# Patient Record
Sex: FEMALE | Race: WHITE | NOT HISPANIC OR LATINO | URBAN - METROPOLITAN AREA
[De-identification: names, ages, dates, MRNs, and addresses within clinical notes are randomized per-mention and may not be internally consistent; named-entity substitution may affect disease eponyms.]

---

## 2017-03-28 ENCOUNTER — SURGERY - HEALTHEAST (OUTPATIENT)
Dept: SURGERY | Facility: HOSPITAL | Age: 72
End: 2017-03-28

## 2017-03-28 ENCOUNTER — ANESTHESIA - HEALTHEAST (OUTPATIENT)
Dept: SURGERY | Facility: HOSPITAL | Age: 72
End: 2017-03-28

## 2017-03-29 ASSESSMENT — MIFFLIN-ST. JEOR: SCORE: 1041.88

## 2017-04-20 ENCOUNTER — RECORDS - HEALTHEAST (OUTPATIENT)
Dept: ADMINISTRATIVE | Facility: OTHER | Age: 72
End: 2017-04-20

## 2019-06-11 ENCOUNTER — ANESTHESIA - HEALTHEAST (OUTPATIENT)
Dept: SURGERY | Facility: HOSPITAL | Age: 74
End: 2019-06-11

## 2019-06-11 ENCOUNTER — SURGERY - HEALTHEAST (OUTPATIENT)
Dept: SURGERY | Facility: HOSPITAL | Age: 74
End: 2019-06-11

## 2019-06-11 ASSESSMENT — MIFFLIN-ST. JEOR: SCORE: 935.42

## 2019-06-12 ASSESSMENT — MIFFLIN-ST. JEOR: SCORE: 940.41

## 2019-06-16 ASSESSMENT — MIFFLIN-ST. JEOR: SCORE: 965.81

## 2019-06-17 ASSESSMENT — MIFFLIN-ST. JEOR: SCORE: 966.71

## 2019-06-18 ENCOUNTER — RECORDS - HEALTHEAST (OUTPATIENT)
Dept: LAB | Facility: CLINIC | Age: 74
End: 2019-06-18

## 2019-06-18 ENCOUNTER — AMBULATORY - HEALTHEAST (OUTPATIENT)
Dept: GERIATRICS | Facility: CLINIC | Age: 74
End: 2019-06-18

## 2019-06-18 LAB — INR PPP: 1.45 (ref 0.9–1.1)

## 2019-06-19 ENCOUNTER — COMMUNICATION - HEALTHEAST (OUTPATIENT)
Dept: GERIATRICS | Facility: CLINIC | Age: 74
End: 2019-06-19

## 2019-06-20 ENCOUNTER — RECORDS - HEALTHEAST (OUTPATIENT)
Dept: LAB | Facility: CLINIC | Age: 74
End: 2019-06-20

## 2019-06-20 ENCOUNTER — OFFICE VISIT - HEALTHEAST (OUTPATIENT)
Dept: GERIATRICS | Facility: CLINIC | Age: 74
End: 2019-06-20

## 2019-06-20 DIAGNOSIS — D50.0 BLOOD LOSS ANEMIA: ICD-10-CM

## 2019-06-20 DIAGNOSIS — R79.0 LOW MAGNESIUM LEVEL: ICD-10-CM
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
## 2019-06-20 DIAGNOSIS — Z96.642 STATUS POST TOTAL REPLACEMENT OF LEFT HIP: ICD-10-CM

## 2019-06-20 DIAGNOSIS — S72.002A HIP FRACTURE, LEFT, CLOSED, INITIAL ENCOUNTER (H): ICD-10-CM

## 2019-06-20 DIAGNOSIS — I82.4Y2 ACUTE DEEP VEIN THROMBOSIS (DVT) OF PROXIMAL VEIN OF LEFT LOWER EXTREMITY (H): ICD-10-CM

## 2019-06-20 DIAGNOSIS — E87.5 HYPERKALEMIA: ICD-10-CM

## 2019-06-20 DIAGNOSIS — E46 PROTEIN-CALORIE MALNUTRITION, UNSPECIFIED SEVERITY (H): ICD-10-CM

## 2019-06-20 LAB — INR PPP: 1.78 (ref 0.9–1.1)

## 2019-06-20 RX ORDER — HYDROXYZINE HYDROCHLORIDE 25 MG/1
25 TABLET, FILM COATED ORAL EVERY 6 HOURS PRN
Status: SHIPPED | COMMUNITY
Start: 2019-06-20

## 2019-06-21 ENCOUNTER — RECORDS - HEALTHEAST (OUTPATIENT)
Dept: LAB | Facility: CLINIC | Age: 74
End: 2019-06-21

## 2019-06-24 ENCOUNTER — OFFICE VISIT - HEALTHEAST (OUTPATIENT)
Dept: GERIATRICS | Facility: CLINIC | Age: 74
End: 2019-06-24

## 2019-06-24 DIAGNOSIS — E87.5 HYPERKALEMIA: ICD-10-CM

## 2019-06-24 DIAGNOSIS — Z96.642 STATUS POST TOTAL REPLACEMENT OF LEFT HIP: ICD-10-CM

## 2019-06-24 DIAGNOSIS — L89.152 PRESSURE INJURY OF COCCYGEAL REGION, STAGE 2 (H): ICD-10-CM

## 2019-06-24 DIAGNOSIS — D50.0 BLOOD LOSS ANEMIA: ICD-10-CM

## 2019-06-24 DIAGNOSIS — E46 PROTEIN-CALORIE MALNUTRITION, UNSPECIFIED SEVERITY (H): ICD-10-CM

## 2019-06-24 DIAGNOSIS — I82.4Y2 ACUTE DEEP VEIN THROMBOSIS (DVT) OF PROXIMAL VEIN OF LEFT LOWER EXTREMITY (H): ICD-10-CM

## 2019-06-24 LAB
ANION GAP SERPL CALCULATED.3IONS-SCNC: 11 MMOL/L (ref 5–18)
BASOPHILS # BLD AUTO: 0.1 THOU/UL (ref 0–0.2)
BASOPHILS NFR BLD AUTO: 1 % (ref 0–2)
BUN SERPL-MCNC: 25 MG/DL (ref 8–28)
CALCIUM SERPL-MCNC: 9.4 MG/DL (ref 8.5–10.5)
CHLORIDE BLD-SCNC: 99 MMOL/L (ref 98–107)
CO2 SERPL-SCNC: 27 MMOL/L (ref 22–31)
CREAT SERPL-MCNC: 0.91 MG/DL (ref 0.6–1.1)
EOSINOPHIL # BLD AUTO: 0.3 THOU/UL (ref 0–0.4)
EOSINOPHIL NFR BLD AUTO: 3 % (ref 0–6)
ERYTHROCYTE [DISTWIDTH] IN BLOOD BY AUTOMATED COUNT: 14.6 % (ref 11–14.5)
GFR SERPL CREATININE-BSD FRML MDRD: >60 ML/MIN/1.73M2
GLUCOSE BLD-MCNC: 90 MG/DL (ref 70–125)
HCT VFR BLD AUTO: 29.4 % (ref 35–47)
HGB BLD-MCNC: 8.6 G/DL (ref 12–16)
INR PPP: 2.11 (ref 0.9–1.1)
LYMPHOCYTES # BLD AUTO: 1.3 THOU/UL (ref 0.8–4.4)
LYMPHOCYTES NFR BLD AUTO: 11 % (ref 20–40)
MAGNESIUM SERPL-MCNC: 1.9 MG/DL (ref 1.8–2.6)
MCH RBC QN AUTO: 30.4 PG (ref 27–34)
MCHC RBC AUTO-ENTMCNC: 29.3 G/DL (ref 32–36)
MCV RBC AUTO: 104 FL (ref 80–100)
MONOCYTES # BLD AUTO: 0.8 THOU/UL (ref 0–0.9)
MONOCYTES NFR BLD AUTO: 7 % (ref 2–10)
NEUTROPHILS # BLD AUTO: 9.2 THOU/UL (ref 2–7.7)
NEUTROPHILS NFR BLD AUTO: 79 % (ref 50–70)
PLATELET # BLD AUTO: 709 THOU/UL (ref 140–440)
PMV BLD AUTO: 8.5 FL (ref 8.5–12.5)
POTASSIUM BLD-SCNC: 5.1 MMOL/L (ref 3.5–5)
RBC # BLD AUTO: 2.83 MILL/UL (ref 3.8–5.4)
SODIUM SERPL-SCNC: 137 MMOL/L (ref 136–145)
TSH SERPL DL<=0.005 MIU/L-ACNC: 2.97 UIU/ML (ref 0.3–5)
WBC: 11.9 THOU/UL (ref 4–11)

## 2019-06-25 ENCOUNTER — OFFICE VISIT - HEALTHEAST (OUTPATIENT)
Dept: GERIATRICS | Facility: CLINIC | Age: 74
End: 2019-06-25

## 2019-06-25 DIAGNOSIS — I82.4Y2 ACUTE DEEP VEIN THROMBOSIS (DVT) OF PROXIMAL VEIN OF LEFT LOWER EXTREMITY (H): ICD-10-CM
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
## 2019-06-25 DIAGNOSIS — R79.0 LOW MAGNESIUM LEVEL: ICD-10-CM

## 2019-06-25 DIAGNOSIS — E55.9 VITAMIN D DEFICIENCY: ICD-10-CM

## 2019-06-25 DIAGNOSIS — E87.5 HYPERKALEMIA: ICD-10-CM

## 2019-06-25 DIAGNOSIS — D50.0 BLOOD LOSS ANEMIA: ICD-10-CM

## 2019-06-25 DIAGNOSIS — Z96.642 STATUS POST TOTAL REPLACEMENT OF LEFT HIP: ICD-10-CM

## 2019-06-25 DIAGNOSIS — E46 PROTEIN-CALORIE MALNUTRITION, UNSPECIFIED SEVERITY (H): ICD-10-CM

## 2019-06-25 LAB — 25(OH)D3 SERPL-MCNC: 19.7 NG/ML (ref 30–80)

## 2019-06-26 ENCOUNTER — RECORDS - HEALTHEAST (OUTPATIENT)
Dept: LAB | Facility: CLINIC | Age: 74
End: 2019-06-26

## 2019-06-26 LAB
FERRITIN SERPL-MCNC: 258 NG/ML (ref 10–130)
HGB BLD-MCNC: 8.5 G/DL (ref 12–16)
INR PPP: 2.29 (ref 0.9–1.1)
POTASSIUM BLD-SCNC: 5.1 MMOL/L (ref 3.5–5)

## 2019-06-27 ENCOUNTER — RECORDS - HEALTHEAST (OUTPATIENT)
Dept: LAB | Facility: CLINIC | Age: 74
End: 2019-06-27

## 2019-06-27 ENCOUNTER — OFFICE VISIT - HEALTHEAST (OUTPATIENT)
Dept: GERIATRICS | Facility: CLINIC | Age: 74
End: 2019-06-27

## 2019-06-27 DIAGNOSIS — E87.5 HYPERKALEMIA: ICD-10-CM

## 2019-06-27 DIAGNOSIS — D50.0 BLOOD LOSS ANEMIA: ICD-10-CM

## 2019-06-27 DIAGNOSIS — I82.4Y2 ACUTE DEEP VEIN THROMBOSIS (DVT) OF PROXIMAL VEIN OF LEFT LOWER EXTREMITY (H): ICD-10-CM

## 2019-06-27 DIAGNOSIS — Z96.642 STATUS POST TOTAL REPLACEMENT OF LEFT HIP: ICD-10-CM

## 2019-06-27 LAB — POTASSIUM BLD-SCNC: 4.5 MMOL/L (ref 3.5–5)

## 2019-07-01 ENCOUNTER — RECORDS - HEALTHEAST (OUTPATIENT)
Dept: LAB | Facility: CLINIC | Age: 74
End: 2019-07-01

## 2019-07-01 ENCOUNTER — COMMUNICATION - HEALTHEAST (OUTPATIENT)
Dept: GERIATRICS | Facility: CLINIC | Age: 74
End: 2019-07-01

## 2019-07-01 LAB
BASOPHILS # BLD AUTO: 0.1 THOU/UL (ref 0–0.2)
BASOPHILS NFR BLD AUTO: 1 % (ref 0–2)
EOSINOPHIL # BLD AUTO: 0.3 THOU/UL (ref 0–0.4)
EOSINOPHIL NFR BLD AUTO: 4 % (ref 0–6)
ERYTHROCYTE [DISTWIDTH] IN BLOOD BY AUTOMATED COUNT: 14.6 % (ref 11–14.5)
FOLATE SERPL-MCNC: 17.2 NG/ML
HCT VFR BLD AUTO: 32.2 % (ref 35–47)
HGB BLD-MCNC: 9.1 G/DL (ref 12–16)
INR PPP: 1.91 (ref 0.9–1.1)
LYMPHOCYTES # BLD AUTO: 1.9 THOU/UL (ref 0.8–4.4)
LYMPHOCYTES NFR BLD AUTO: 29 % (ref 20–40)
MCH RBC QN AUTO: 28.7 PG (ref 27–34)
MCHC RBC AUTO-ENTMCNC: 28.3 G/DL (ref 32–36)
MCV RBC AUTO: 102 FL (ref 80–100)
MONOCYTES # BLD AUTO: 0.8 THOU/UL (ref 0–0.9)
MONOCYTES NFR BLD AUTO: 12 % (ref 2–10)
NEUTROPHILS # BLD AUTO: 3.4 THOU/UL (ref 2–7.7)
NEUTROPHILS NFR BLD AUTO: 54 % (ref 50–70)
PLATELET # BLD AUTO: 773 THOU/UL (ref 140–440)
PMV BLD AUTO: 8.5 FL (ref 8.5–12.5)
POTASSIUM BLD-SCNC: 5 MMOL/L (ref 3.5–5)
RBC # BLD AUTO: 3.17 MILL/UL (ref 3.8–5.4)
VIT B12 SERPL-MCNC: 348 PG/ML (ref 213–816)
WBC: 6.4 THOU/UL (ref 4–11)

## 2019-07-02 ENCOUNTER — OFFICE VISIT - HEALTHEAST (OUTPATIENT)
Dept: GERIATRICS | Facility: CLINIC | Age: 74
End: 2019-07-02

## 2019-07-02 DIAGNOSIS — E87.5 HYPERKALEMIA: ICD-10-CM

## 2019-07-02 DIAGNOSIS — I82.4Y2 ACUTE DEEP VEIN THROMBOSIS (DVT) OF PROXIMAL VEIN OF LEFT LOWER EXTREMITY (H): ICD-10-CM

## 2019-07-02 DIAGNOSIS — E46 PROTEIN-CALORIE MALNUTRITION, UNSPECIFIED SEVERITY (H): ICD-10-CM

## 2019-07-02 DIAGNOSIS — Z96.642 STATUS POST TOTAL REPLACEMENT OF LEFT HIP: ICD-10-CM

## 2019-07-02 DIAGNOSIS — E55.9 VITAMIN D DEFICIENCY: ICD-10-CM

## 2019-07-02 DIAGNOSIS — D50.0 BLOOD LOSS ANEMIA: ICD-10-CM

## 2019-07-02 RX ORDER — FERROUS SULFATE 325(65) MG
1 TABLET ORAL
Status: SHIPPED | COMMUNITY
Start: 2019-07-02

## 2019-07-03 ENCOUNTER — AMBULATORY - HEALTHEAST (OUTPATIENT)
Dept: GERIATRICS | Facility: CLINIC | Age: 74
End: 2019-07-03

## 2021-05-27 ENCOUNTER — RECORDS - HEALTHEAST (OUTPATIENT)
Dept: ADMINISTRATIVE | Facility: CLINIC | Age: 76
End: 2021-05-27

## 2021-05-29 NOTE — ANESTHESIA POSTPROCEDURE EVALUATION
Patient: Vanita Pete  ARTHROPLASTY, LEFT HIP, TOTAL  Anesthesia type: spinal    Patient location: PACU  Last vitals:   Vitals Value Taken Time   /53 6/11/2019  9:44 PM   Temp 36.3  C (97.4  F) 6/11/2019  9:44 PM   Pulse 98 6/11/2019  9:44 PM   Resp 10 6/11/2019  9:44 PM   SpO2 99 % 6/11/2019  9:44 PM     Post vital signs: stable  Level of consciousness: awake and responds to simple questions  Post-anesthesia pain: pain controlled  Post-anesthesia nausea and vomiting: no  Pulmonary: unassisted, return to baseline  Cardiovascular: stable and blood pressure at baseline  Hydration: adequate  Anesthetic events: no    QCDR Measures:  ASA# 11 - Naina-op Cardiac Arrest: ASA11B - Patient did NOT experience unanticipated cardiac arrest  ASA# 12 - Naina-op Mortality Rate: ASA12B - Patient did NOT die  ASA# 13 - PACU Re-Intubation Rate: NA - No ETT / LMA used for case  ASA# 10 - Composite Anes Safety: ASA10A - No serious adverse event    Additional Notes:

## 2021-05-29 NOTE — PROGRESS NOTES
[facility]      Facility:    [facility] [[id number]]  Code Status: FULL CODE      Chief Complaint/Reason for Visit:  Chief Complaint   Patient presents with     Follow Up       HPI:   Vanita is a 74 y.o. female who was seen for an initial TCU visit. She has a past medical history of anemia, distant hx of alcohol abuse, and malnutrition.  She was hospitalized at [facility] 6/11/2019 to 6/17/2019 multiple falls at home.  She had reported falling 3 weeks ago with left pain but thought it would improve so she was not evaluated.  Subsequently she had more falls and pain became unbearable so she went to the ER.  She was to have a la left hip fracture and underwent a left RONNIE. Prior to surgery she was found to have a Left LE acute DVT and an IVC filter was placed. She was placed on coumadin postoperatively.  Post operatively her issues were pain management and electrolyte imbalance including hyperkalemia and hypomagnesemia.  These both resolved with replacement and she was discharged without oral supplements. 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  Neurosurgery recommended a repeat CT in 2 months.     Today, she states her pain improves with pain medication however she is frustrated that she has to wait longer than 4 hours and she states that the 10mg isn't enough.  I did explain that the order was written for oxycodone every 3 hours as needed and for the next couple of days she may just need that to improve until the swelling improves.  She has swelling of her lower leg and knee but minimal swelling of left hip.  She has good mobility and CMS to both limbs.  She has no s/s of DVT of right leg.  She reports she has never had a DEXA scan and she has no intention to follow up on that.  She reports she visits the doctor minimally.  She is to follow up with Orthopedics for incision assessment in 2 weeks.  Incisional dressing is to remain intact  until that visit. She also has a stage 2 pressure ulcer on her coccyx that is treated with a mepilex every 5 days.  She reports good bowel movements and denies any urinary issues.     Past Medical History:  Past Medical History:   Diagnosis Date     DVT (deep vein thrombosis) in pregnancy (H)      Fractured hip, left, closed, initial encounter (H)            Surgical History:  Past Surgical History:   Procedure Laterality Date     IR IVC FILTER PLACEMENT  6/11/2019     NO PAST SURGERIES       ND TOTAL HIP ARTHROPLASTY Left 6/11/2019    Procedure: ARTHROPLASTY, LEFT HIP, TOTAL;  Surgeon: Andre Espinal MD;  Location: [facility];  Service: Orthopedics       Family History:   No family history on file.    Social History:    Social History     Socioeconomic History     Marital status:      Spouse name: Not on file     Number of children: Not on file     Years of education: Not on file     Highest education level: Not on file   Occupational History     Not on file   Social Needs     Financial resource strain: Not on file     Food insecurity:     Worry: Not on file     Inability: Not on file     Transportation needs:     Medical: Not on file     Non-medical: Not on file   Tobacco Use     Smoking status: Current Every Day Smoker     Smokeless tobacco: Current User     Tobacco comment: smoke E-cigarettes   Substance and Sexual Activity     Alcohol use: No     Comment: former alcoholic; sober x 14 years     Drug use: No     Sexual activity: Not on file   Lifestyle     Physical activity:     Days per week: Not on file     Minutes per session: Not on file     Stress: Not on file   Relationships     Social connections:     Talks on phone: Not on file     Gets together: Not on file     Attends Restorationist service: Not on file     Active member of club or organization: Not on file     Attends meetings of clubs or organizations: Not on file     Relationship status: Not on file     Intimate partner violence:     Fear  of current or ex partner: Not on file     Emotionally abused: Not on file     Physically abused: Not on file     Forced sexual activity: Not on file   Other Topics Concern     Not on file   Social History Narrative    , daughter   Has 7 children        Review of Systems   Patient denies fever, chills, headache, lightheadedness, dizziness, rhinorrhea, cough, congestion, shortness of breath, chest pain, palpitations, abdominal pain, n/v, diarrhea, constipation, change in appetite, dysuria, frequency, burning or pain with urination.  Other than stated in HPI all other review of systems is negative.             Physical Exam     Vital signs: 113/60, HR 97, resp 24, tep 98.2, 102.9lbs.   GENERAL APPEARANCE: Thin elderly female in no acute distress.  HEENT: normocephalic, atraumatic  PERRL, sclerae anicteric, conjunctivae clear and moist, EOM intact  NECK: Supple and symmetric. Trachea is midline, no thyromegaly, no adenopathy, and no tenderness  LUNGS: Lung sounds CTA, no adventitious sounds, respiratory effort normal.  CARD: RRR, S1, S2, without murmurs, gallops, rubs, no JVD,   ABD: Soft and nontender with normal bowel sounds.   MSK: Muscle strength and tone were normal.  EXTREMITIES: edema and significant bruising to her left knee, swelling of left lower extremity.  Good CMS bilaterally LE  NEURO: Alert and oriented x 3. Face is symmetric.  SKIN: Left hip incision is dressed with occlusive dressing.  Coccyx ulcer dressed with occlusive dressing.   PSYCH: euthymic          Medication List:  Current Outpatient Medications   Medication Sig     hydrOXYzine HCl (ATARAX) 25 MG tablet Take 25 mg by mouth every 6 (six) hours as needed for itching.     acetaminophen (TYLENOL) 500 MG tablet Take 2 tablets (1,000 mg total) by mouth 3 (three) times a day.     oxyCODONE (ROXICODONE) 5 MG immediate release tablet Take 1-2 tablets (5-10 mg total) by mouth every 3 (three) hours as needed.     senna-docusate (PERICOLACE) 8.6-50  mg tablet Take 1 tablet by mouth 2 (two) times a day.     warfarin (COUMADIN/JANTOVEN) 4 MG tablet Take 1 tablet (4 mg total) by mouth daily. Take 1 tablet (4 mg) by mouth daily. Adjust dose based on INR results as directed. (Patient taking differently: Take 4 mg by mouth daily. 6/18/19 INR 1.45 4mg daily. NExt INR 6/20.  6/17/19 INR 1.63 4mg  6/16/19 INR 1.35 had 4mg  6/15/19 INR 1.13 had 4mg.  Also had 4mg 6/13 & 14th.  Take 1 tablet (4 mg) by mouth daily. Adjust dose based on INR results as directed.      )       Labs:  Recent Results (from the past 240 hour(s))   HM2(CBC w/o Differential)   Result Value Ref Range    WBC 7.4 4.0 - 11.0 thou/uL    RBC 3.40 (L) 3.80 - 5.40 mill/uL    Hemoglobin 10.3 (L) 12.0 - 16.0 g/dL    Hematocrit 34.4 (L) 35.0 - 47.0 %     (H) 80 - 100 fL    MCH 30.3 27.0 - 34.0 pg    MCHC 29.9 (L) 32.0 - 36.0 g/dL    RDW 13.9 11.0 - 14.5 %    Platelets 393 140 - 440 thou/uL    MPV 8.3 (L) 8.5 - 12.5 fL   Basic Metabolic Panel   Result Value Ref Range    Sodium 138 136 - 145 mmol/L    Potassium 4.9 3.5 - 5.0 mmol/L    Chloride 107 98 - 107 mmol/L    CO2 23 22 - 31 mmol/L    Anion Gap, Calculation 8 5 - 18 mmol/L    Glucose 88 70 - 125 mg/dL    Calcium 9.8 8.5 - 10.5 mg/dL    BUN 32 (H) 8 - 28 mg/dL    Creatinine 0.89 0.60 - 1.10 mg/dL    GFR MDRD Af Amer >60 >60 mL/min/1.73m2    GFR MDRD Non Af Amer >60 >60 mL/min/1.73m2   ECG 12 lead nursing unit performed   Result Value Ref Range    SYSTOLIC BLOOD PRESSURE  mmHg    DIASTOLIC BLOOD PRESSURE  mmHg    VENTRICULAR RATE 91 BPM    ATRIAL RATE 91 BPM    P-R INTERVAL 122 ms    QRS DURATION 74 ms    Q-T INTERVAL 346 ms    QTC CALCULATION (BEZET) 425 ms    P Axis 86 degrees    R AXIS 74 degrees    T AXIS 72 degrees    MUSE DIAGNOSIS       Sinus rhythm with Premature atrial complexes  Minimal voltage criteria for LVH, may be normal variant  ST abnormality, possible digitalis effect  Abnormal ECG    Confirmed by JAMESON FUCHS, LESLIE LOC: ([id number]) on  6/11/2019 3:28:33 PM     Echo Complete   Result Value Ref Range    LV volume diastolic 49.8 46 - 106 cm3    LV volume systolic 12.4 (!) 14 - 42 cm3    HR 77 bpm    IVSd 0.916 (!) 0.6 - 0.9 cm    LVIDd 3.72 (!) 3.8 - 5.2 cm    LVIDs 2.37 2.2 - 3.5 cm    LVOT diam 2.1 cm    LVOT mean gradient 3 mmHg    LVOT peak VTI 21.7 cm    LVOT mean neil 76.5 cm/s    LVOT peak neil 112 cm/s    LVOT peak gradient 5 mmHg    LV PWd 0.959 (!) 0.6 - 0.9 cm    MV E' lat neil 8.51 cm/s    MV E' med neil 9.86 cm/s    AV cusp sep 1.9 cm    AV cusp sep 1.9 cm    AV mean neil 95.3 cm/s    AV mean gradient 4 mmHg    AV VTI 26.4 cm    AV peak neil 124 cm/s    AO root 3.1 cm    LA size 2.5 cm    LA length 3.6 cm    MV decel slope 6,110 mm/s2    MV decel time 208 ms    MV P 1/2 time 54 ms    MV peak A neil 73.7 cm/s    MV peak E neil 76.2 cm/s    MV mean neil 71.8 cm/s    MV mean gradient 2 mmHg    MV VTI 19.8 cm    MV peak velocityoctiy 105 cm/s    TR peak neil 286 cm/s    LA area 2 10.3 cm2    LA area 1 10.7 cm2    BSA 1.42 m2    Hieght 65 in    Weight 1,550.4 lbs    /72 mmHg    IVS/PW ratio 1.0     TR peak gradent 32.7 mmHg    LV FS 36.3 28 - 44 %    Echo LVEF calculated 75 55 - 75 %    LA volume 26.0 mL    LV mass 103.5 g    AV area 2.8 cm2    AV DIM IND neil 0.9     MV area p 1/2 time 4.1 cm2    MV area cont eq 3.8 cm2    MV E/A Ratio 1.0     LVOT area 3.46 cm2    LVOT SV 75.1 cm3    AV peak gradient 6.2 mmHg    MV peak gradient 4.4 mmHg    LV systolic volume index 8.7 8 - 24 cm3/m2    LV diastolic volume index 35.1 29 - 61 cm3/m2    LA volume index 18.3 mL/m2    LV mass index 72.9 g/m2    LV SVi 52.9 ml/m2    TAPSE 2.0 cm    MV med E/e' ratio 7.7     MV lat E/e' ratio 9.0     LV CO 5.8 l/min    LV Ci 4.1 l/min/m2    Height 65.0 in    Weight 97 lbs    MV Avg E/e' Ratio 8.3 cm/s    AV DIM IND VTI 0.8     MVA VTI 3.79 cm2    Echo LVEF Estimated 65 %   Magnesium   Result Value Ref Range    Magnesium 1.7 (L) 1.8 - 2.6 mg/dL   Protime-INR   Result  Value Ref Range    INR 0.91 0.90 - 1.10   Hemoglobin   Result Value Ref Range    Hemoglobin 8.9 (L) 12.0 - 16.0 g/dL   Basic metabolic panel   Result Value Ref Range    Sodium 136 136 - 145 mmol/L    Potassium 6.6 (HH) 3.5 - 5.0 mmol/L    Chloride 108 (H) 98 - 107 mmol/L    CO2 19 (L) 22 - 31 mmol/L    Anion Gap, Calculation 9 5 - 18 mmol/L    Glucose 94 70 - 125 mg/dL    Calcium 8.5 8.5 - 10.5 mg/dL    BUN 20 8 - 28 mg/dL    Creatinine 0.84 0.60 - 1.10 mg/dL    GFR MDRD Af Amer >60 >60 mL/min/1.73m2    GFR MDRD Non Af Amer >60 >60 mL/min/1.73m2   Magnesium   Result Value Ref Range    Magnesium 2.7 (H) 1.8 - 2.6 mg/dL   INR   Result Value Ref Range    INR 1.00 0.90 - 1.10   HM1 (CBC with Diff)   Result Value Ref Range    WBC 7.3 4.0 - 11.0 thou/uL    RBC 2.39 (L) 3.80 - 5.40 mill/uL    Hemoglobin 7.3 (L) 12.0 - 16.0 g/dL    Hematocrit 24.6 (L) 35.0 - 47.0 %     (H) 80 - 100 fL    MCH 30.5 27.0 - 34.0 pg    MCHC 29.7 (L) 32.0 - 36.0 g/dL    RDW 13.7 11.0 - 14.5 %    Platelets 259 140 - 440 thou/uL    MPV 8.2 (L) 8.5 - 12.5 fL    Neutrophils % 81 (H) 50 - 70 %    Lymphocytes % 10 (L) 20 - 40 %    Monocytes % 9 2 - 10 %    Eosinophils % 0 0 - 6 %    Basophils % 0 0 - 2 %    Neutrophils Absolute 5.9 2.0 - 7.7 thou/uL    Lymphocytes Absolute 0.7 (L) 0.8 - 4.4 thou/uL    Monocytes Absolute 0.6 0.0 - 0.9 thou/uL    Eosinophils Absolute 0.0 0.0 - 0.4 thou/uL    Basophils Absolute 0.0 0.0 - 0.2 thou/uL   ECG 12 lead with MUSE, verify if completed in ER   Result Value Ref Range    SYSTOLIC BLOOD PRESSURE  mmHg    DIASTOLIC BLOOD PRESSURE  mmHg    VENTRICULAR RATE 86 BPM    ATRIAL RATE 86 BPM    P-R INTERVAL 122 ms    QRS DURATION 78 ms    Q-T INTERVAL 362 ms    QTC CALCULATION (BEZET) 433 ms    P Axis 79 degrees    R AXIS 59 degrees    T AXIS 60 degrees    MUSE DIAGNOSIS       Normal sinus rhythm  Normal ECG  When compared with ECG of 11-JUN-2019 12:23,  Premature atrial complexes are no longer Present  Confirmed by  EPIFANIO GRIJALVA MD LOC:[facility] ([id number]) on 6/12/2019 4:32:23 PM     Potassium   Result Value Ref Range    Potassium 4.8 3.5 - 5.0 mmol/L   Albumin   Result Value Ref Range    Albumin 2.7 (L) 3.5 - 5.0 g/dL   POCT Glucose   Result Value Ref Range    Glucose 150 (H) 70 - 139 mg/dL   Basic Metabolic Panel   Result Value Ref Range    Sodium 136 136 - 145 mmol/L    Potassium 5.0 3.5 - 5.0 mmol/L    Chloride 102 98 - 107 mmol/L    CO2 28 22 - 31 mmol/L    Anion Gap, Calculation 6 5 - 18 mmol/L    Glucose 112 70 - 125 mg/dL    Calcium 8.2 (L) 8.5 - 10.5 mg/dL    BUN 23 8 - 28 mg/dL    Creatinine 1.00 0.60 - 1.10 mg/dL    GFR MDRD Af Amer >60 >60 mL/min/1.73m2    GFR MDRD Non Af Amer 54 (L) >60 mL/min/1.73m2   Hemoglobin   Result Value Ref Range    Hemoglobin 6.4 (LL) 12.0 - 16.0 g/dL   Type and Screen   Result Value Ref Range    ABORh A POS     Antibody Screen Negative Negative   Magnesium   Result Value Ref Range    Magnesium 1.6 (L) 1.8 - 2.6 mg/dL   Basic Metabolic Panel   Result Value Ref Range    Sodium 136 136 - 145 mmol/L    Potassium 4.4 3.5 - 5.0 mmol/L    Chloride 105 98 - 107 mmol/L    CO2 26 22 - 31 mmol/L    Anion Gap, Calculation 5 5 - 18 mmol/L    Glucose 93 70 - 125 mg/dL    Calcium 8.1 (L) 8.5 - 10.5 mg/dL    BUN 20 8 - 28 mg/dL    Creatinine 0.81 0.60 - 1.10 mg/dL    GFR MDRD Af Amer >60 >60 mL/min/1.73m2    GFR MDRD Non Af Amer >60 >60 mL/min/1.73m2   Hemoglobin - Daily x 2   Result Value Ref Range    Hemoglobin 8.0 (L) 12.0 - 16.0 g/dL   Crossmatch   Result Value Ref Range    Crossmatch Compatible     Blood Expiration Date [id number]     Unit Type A Pos     Unit Number [id number]     Status Transfused     Component Red Blood Cells     PRODUCT CODE H7410M61     Issue Date and Time [id number]     Blood Type 6200     CODING SYSTEM [id number]    INR   Result Value Ref Range    INR 0.94 0.90 - 1.10   Basic Metabolic Panel   Result Value Ref Range    Sodium 138 136 - 145 mmol/L    Potassium 4.2 3.5 - 5.0  mmol/L    Chloride 107 98 - 107 mmol/L    CO2 26 22 - 31 mmol/L    Anion Gap, Calculation 5 5 - 18 mmol/L    Glucose 113 70 - 125 mg/dL    Calcium 8.3 (L) 8.5 - 10.5 mg/dL    BUN 19 8 - 28 mg/dL    Creatinine 0.73 0.60 - 1.10 mg/dL    GFR MDRD Af Amer >60 >60 mL/min/1.73m2    GFR MDRD Non Af Amer >60 >60 mL/min/1.73m2   Hemoglobin   Result Value Ref Range    Hemoglobin 7.8 (L) 12.0 - 16.0 g/dL   Magnesium   Result Value Ref Range    Magnesium 1.6 (L) 1.8 - 2.6 mg/dL   INR   Result Value Ref Range    INR 1.13 (H) 0.90 - 1.10   Basic Metabolic Panel   Result Value Ref Range    Sodium 140 136 - 145 mmol/L    Potassium 4.7 3.5 - 5.0 mmol/L    Chloride 105 98 - 107 mmol/L    CO2 28 22 - 31 mmol/L    Anion Gap, Calculation 7 5 - 18 mmol/L    Glucose 97 70 - 125 mg/dL    Calcium 8.8 8.5 - 10.5 mg/dL    BUN 21 8 - 28 mg/dL    Creatinine 0.71 0.60 - 1.10 mg/dL    GFR MDRD Af Amer >60 >60 mL/min/1.73m2    GFR MDRD Non Af Amer >60 >60 mL/min/1.73m2   Hemoglobin   Result Value Ref Range    Hemoglobin 8.0 (L) 12.0 - 16.0 g/dL   Magnesium   Result Value Ref Range    Magnesium 1.8 1.8 - 2.6 mg/dL   INR   Result Value Ref Range    INR 1.35 (H) 0.90 - 1.10   Magnesium   Result Value Ref Range    Magnesium 1.6 (L) 1.8 - 2.6 mg/dL   Basic Metabolic Panel   Result Value Ref Range    Sodium 137 136 - 145 mmol/L    Potassium 4.6 3.5 - 5.0 mmol/L    Chloride 103 98 - 107 mmol/L    CO2 29 22 - 31 mmol/L    Anion Gap, Calculation 5 5 - 18 mmol/L    Glucose 100 70 - 125 mg/dL    Calcium 8.5 8.5 - 10.5 mg/dL    BUN 21 8 - 28 mg/dL    Creatinine 0.69 0.60 - 1.10 mg/dL    GFR MDRD Af Amer >60 >60 mL/min/1.73m2    GFR MDRD Non Af Amer >60 >60 mL/min/1.73m2   Hemoglobin   Result Value Ref Range    Hemoglobin 7.2 (L) 12.0 - 16.0 g/dL   INR   Result Value Ref Range    INR 1.63 (H) 0.90 - 1.10   Magnesium   Result Value Ref Range    Magnesium 1.8 1.8 - 2.6 mg/dL   Basic Metabolic Panel   Result Value Ref Range    Sodium 139 136 - 145 mmol/L     Potassium 4.9 3.5 - 5.0 mmol/L    Chloride 101 98 - 107 mmol/L    CO2 31 22 - 31 mmol/L    Anion Gap, Calculation 7 5 - 18 mmol/L    Glucose 98 70 - 125 mg/dL    Calcium 8.8 8.5 - 10.5 mg/dL    BUN 22 8 - 28 mg/dL    Creatinine 0.75 0.60 - 1.10 mg/dL    GFR MDRD Af Amer >60 >60 mL/min/1.73m2    GFR MDRD Non Af Amer >60 >60 mL/min/1.73m2   Hemoglobin   Result Value Ref Range    Hemoglobin 7.5 (L) 12.0 - 16.0 g/dL   INR   Result Value Ref Range    INR 1.45 (H) 0.90 - 1.10   INR   Result Value Ref Range    INR 1.78 (H) 0.90 - 1.10         Assessment:    ICD-10-CM    1. Status post total replacement of left hip Z96.642    2. Fall, sequela W19.XXXS    3. Low magnesium level R79.0    4. Hyperkalemia E87.5    5. Blood loss anemia D50.0    6. Hip fracture, left, closed, initial encounter (H) S72.002A    7. Acute deep vein thrombosis (DVT) of proximal vein of left lower extremity (H) I82.4Y2    8. Protein-calorie malnutrition, unspecified severity (H) E46        Plan:  RONNIE: follow up with orthopedics in 2 weeks.  Apply ICE every hour. Continue with oxycodone 5-10mg every 3 hours.  Add hydroxyzine 25mg every 6 hours with oxycodone. Continue to wear TEDS on during the day and off at night.  Continue with Senna-S prn.     Hypomagnesemia: will check mag on Monday.     Hyperkalemia: will check potassium on Monday.     Anemia: will check CBC on Monday    Hip fracture: check TSH and vitamin D level.  It is likely patient will not agree to fosamax.  I did  on the fact that she likely needed Calcium and vitamin D for bone healing.  Will discuss again with her and hopefully she will accept.     DVT: IVC filter due to be removed in 2 weeks.  INR today was 1.78.  Will give 5mg 6/20, 6/21, 2.5mg 6/22 and 5mg 6/23 and recheck 6/24.     Malnutrition: Albumin 2.7 will add oral supplements BID    40 minutes total time spent with 20 minutes spent face to face with patient in counseling of pathology of DVT, purpose of IVC filter, and  coumadin.  Counseling of pathology of fractures and possible relation to osteoporosis and work up for that and coordination of the above plan of care.       Electronically signed by: Argenis Vera CNP

## 2021-05-29 NOTE — ANESTHESIA PREPROCEDURE EVALUATION
Anesthesia Evaluation      Patient summary reviewed   No history of anesthetic complications     Airway   Mallampati: II  Neck ROM: full   Pulmonary - normal exam    breath sounds clear to auscultation  (+) a smoker (current every day)                         Cardiovascular - negative ROS  Exercise tolerance: > or = 4 METS  (-) murmur  ECG reviewed (6/11/19: NSR w/ PACs, 91 bpm)  Rhythm: regular  Rate: normal,    no murmur   ROS comment: Echo 6/11/19:    No previous study for comparison.    Normal left ventricular size and systolic function.    Left ventricle ejection fraction is normal. The estimated left ventricular ejection fraction is 65%.    Normal right ventricular size and systolic function.    No hemodynamically significant valvular heart abnormalities.        Neuro/Psych    (+) depression, anxiety/panic attacks,     Endo/Other       Comments: Anemia    GI/Hepatic/Renal      Comments: Hx of EtOH abuse     Other findings: 75 y/o F w/ hip fx 2/2 fall at home and concurrent LLE DVT now s/p IVC filter placement by IR.    Labs 6/11/19:  WBC 7.4, Hgb 10.3, Plt 393  INR 0.91      Dental - normal exam                        Anesthesia Plan  Planned anesthetic: spinal  Avoid midazolam, anticholinergics and meperidine 2/2 age and increased risk of postop delirium.  Spinal anesthesia w/ propofol gtt.  Ketamine for positioning for spinal.  Discussed potential need to convert GA.  Discussed potential need for blood transfusion.  Decadron 4 mg and zofran for PONV ppx.  ASA 3     Anesthetic plan and risks discussed with: patient and spouse  Anesthesia plan special considerations: antiemetics,   Post-op plan: routine recovery

## 2021-05-29 NOTE — TELEPHONE ENCOUNTER
[facility] Nurse Triage Telephone Note      Provider: ADRIANA Guzman  Facility: [facility]    Facility Type: TCU    Caller: ANDREY  Call Back Number:  [phone]    Allergies: Patient has no known allergies.    Reason for call: Pt new admit 6/17. Was confusion as to who following [facility] or [facility]. Pt has [employer] ins & was at [facility] so we will follow. She had L RONNIE, L DVT & IVC filter placed.     INR 6/18/19 1.45 4mg given. What further dose & when next INR?  6/17/19 INR 1.63 had 4mg,      6/16/19 INR 1.35 had 4mg,  6/15/19 INR 1.13 had 4mg.    Verbal Order/Direction given by Provider: Give 4mg tonight, check INR in am.    Provider giving order: ADRIANA Guzman    Verbal order given to: ANDREY Wheeler RN

## 2021-05-29 NOTE — ANESTHESIA PROCEDURE NOTES
Spinal Block    Patient location during procedure: OR  Start time: 6/11/2019 6:25 PM  End time: 6/11/2019 6:28 PM  Reason for block: primary anesthetic    Staffing:  Performing  Anesthesiologist: Rosendo Houston MD    Preanesthetic Checklist  Completed: patient identified, risks, benefits, and alternatives discussed, timeout performed, consent obtained, airway assessed, oxygen available, suction available, emergency drugs available and hand hygiene performed  Spinal Block  Patient position: right lateral decubitus  Prep: ChloraPrep  Patient monitoring: heart rate, cardiac monitor, continuous pulse ox and blood pressure  Approach: midline  Location: L3-4  Injection technique: single-shot  Needle type: pencil-tip   Needle gauge: 24 G

## 2021-05-29 NOTE — ANESTHESIA CARE TRANSFER NOTE
Last vitals:   Vitals:    06/11/19 2030   BP: 115/55   Pulse: 95   Resp: 21   Temp: 37.2  C (99  F)   SpO2: 100%     Patient's level of consciousness is awake  Spontaneous respirations: yes  Maintains airway independently: yes  Dentition unchanged: yes  Oropharynx: oropharynx clear of all foreign objects    QCDR Measures:  ASA# 20 - Surgical Safety Checklist: WHO surgical safety checklist completed prior to induction    PQRS# 430 - Adult PONV Prevention: 4558F - Pt received => 2 anti-emetic agents (different classes) preop & intraop  ASA# 8 - Peds PONV Prevention: NA - Not pediatric patient, not GA or 2 or more risk factors NOT present  PQRS# 424 - Naina-op Temp Management: 4559F - At least one body temp DOCUMENTED => 35.5C or 95.9F within required timeframe  PQRS# 426 - PACU Transfer Protocol: - Transfer of care checklist used  ASA# 14 - Acute Post-op Pain: ASA14B - Patient did NOT experience pain >= 7 out of 10

## 2021-05-30 VITALS — BODY MASS INDEX: 20.06 KG/M2 | HEIGHT: 65 IN | WEIGHT: 120.37 LBS

## 2021-05-30 NOTE — PROGRESS NOTES
[facility]      Facility:    [facility] [[id number]]  Code Status: FULL CODE      Chief Complaint/Reason for Visit:  Chief Complaint   Patient presents with     Follow Up       HPI:   Vanita is a 74 y.o. female who was seen for an initial TCU visit. She has a past medical history of anemia, distant hx of alcohol abuse, and malnutrition.  She was hospitalized at [place] 6/11/2019 to 6/17/2019 multiple falls at home.  She had reported falling 3 weeks ago with left pain but thought it would improve so she was not evaluated.  Subsequently she had more falls and pain became unbearable so she went to the ER.  She was to have a la left hip fracture and underwent a left RONNIE. Prior to surgery she was found to have a Left LE acute DVT and an IVC filter was placed. She was placed on coumadin postoperatively.  Post operatively her issues were pain management and electrolyte imbalance including hyperkalemia and hypomagnesemia.  These both resolved with replacement and she was discharged without oral supplements. 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  Neurosurgery recommended a repeat CT in 2 months.     Today, reports her pain is better managed with taking her oxycodone every 3-4 hours.  She is also seen in improvement in management after scheduling the hydroxyzine for 2 days last week.  She reports she did not take oxycodone at bedtime last night and she woke up in more pain this morning and so she will take that tonight.  Her incision is well approximated without any sutures or staples and show no signs and symptoms of infection I did instruct her that she did not need to have a dressing and only to put one on for comfort if she felt she needed it.  Is good CMS to her extremities however does have significant swelling of her left extremity of 2+ pitting edema.  She does have good peripheral pulses.  She is progressing therapy and ambulating  with a walker with standby assist however she feels she is not steady enough to be discharged until next week.      Her magnesium was drawn today and it was within normal limits also her BMP was drawn and also in with normal limits.  She did have a low vitamin D of 19.7 that was drawn on Monday.  Her hemoglobin was also drawn on Monday and was a little better at 8.6 from 7.5 at hospital discharge.    For her malnutrition she prefers to drink the Ensure's rather than the boost breezes and so this was discontinued.  She was started on calcium with vitamin D and iron yesterday by Dr. Yost.  Her TSH which was drawn yesterday was within normal limits of 2.97.    Past Medical History:  Past Medical History:   Diagnosis Date     DVT (deep vein thrombosis) in pregnancy (H)      Fractured hip, left, closed, initial encounter (H)            Surgical History:  Past Surgical History:   Procedure Laterality Date     IR IVC FILTER PLACEMENT  6/11/2019     NO PAST SURGERIES       FL TOTAL HIP ARTHROPLASTY Left 6/11/2019    Procedure: ARTHROPLASTY, LEFT HIP, TOTAL;  Surgeon: Andre Espinal MD;  Location: [facility];  Service: Orthopedics       Family History:   No family history on file.    Social History:    Social History     Socioeconomic History     Marital status:      Spouse name: Not on file     Number of children: Not on file     Years of education: Not on file     Highest education level: Not on file   Occupational History     Not on file   Social Needs     Financial resource strain: Not on file     Food insecurity:     Worry: Not on file     Inability: Not on file     Transportation needs:     Medical: Not on file     Non-medical: Not on file   Tobacco Use     Smoking status: Current Every Day Smoker     Smokeless tobacco: Current User     Tobacco comment: smoke E-cigarettes   Substance and Sexual Activity     Alcohol use: No     Comment: former alcoholic; sober x 14 years     Drug use: No     Sexual  activity: Not on file   Lifestyle     Physical activity:     Days per week: Not on file     Minutes per session: Not on file     Stress: Not on file   Relationships     Social connections:     Talks on phone: Not on file     Gets together: Not on file     Attends Rastafari service: Not on file     Active member of club or organization: Not on file     Attends meetings of clubs or organizations: Not on file     Relationship status: Not on file     Intimate partner violence:     Fear of current or ex partner: Not on file     Emotionally abused: Not on file     Physically abused: Not on file     Forced sexual activity: Not on file   Other Topics Concern     Not on file   Social History Narrative    , daughter   Has 7 children        Review of Systems   Patient denies fever, chills, headache, lightheadedness, dizziness, rhinorrhea, cough, congestion, shortness of breath, chest pain, palpitations, abdominal pain, n/v, diarrhea, constipation, change in appetite, dysuria, frequency, burning or pain with urination.  Other than stated in HPI all other review of systems is negative.             Physical Exam     Vital signs: /67, heart rate 108, respiratory 16, temp 97.9.   GENERAL APPEARANCE: Thin elderly female in no acute distress.  HEENT: normocephalic, atraumatic  PERRL, sclerae anicteric, conjunctivae clear and moist, EOM intact  LUNGS: Lung sounds CTA, no adventitious sounds, respiratory effort normal.  CARD: RRR, S1, S2, without murmurs, gallops, rubs, no JVD,   ABD: Soft and nontender with normal bowel sounds.   MSK: Muscle strength and tone were normal.  EXTREMITIES: 2+ pitting edema of left lower extremity, right lower extremity with soft nonpitting edema.  Good CMS bilaterally LE  NEURO: Alert and oriented x 3. Face is symmetric.  SKIN: Left hip incision is well approximated without sutures or staples.  No drainage, signs and symptoms of infection..  Coccyx ulcer dressed with occlusive dressing.    PSYCH: euthymic          Medication List:  Current Outpatient Medications   Medication Sig     acetaminophen (TYLENOL) 500 MG tablet Take 2 tablets (1,000 mg total) by mouth 3 (three) times a day.     hydrOXYzine HCl (ATARAX) 25 MG tablet Take 25 mg by mouth every 6 (six) hours as needed for itching.     oxyCODONE (ROXICODONE) 5 MG immediate release tablet Take 1-2 tablets (5-10 mg total) by mouth every 3 (three) hours as needed.     senna-docusate (PERICOLACE) 8.6-50 mg tablet Take 1 tablet by mouth 2 (two) times a day.     warfarin (COUMADIN/JANTOVEN) 4 MG tablet Take 1 tablet (4 mg total) by mouth daily. Take 1 tablet (4 mg) by mouth daily. Adjust dose based on INR results as directed. (Patient taking differently: Take 4 mg by mouth daily. 6/18/19 INR 1.45 4mg daily. NExt INR 6/20.  6/17/19 INR 1.63 4mg  6/16/19 INR 1.35 had 4mg  6/15/19 INR 1.13 had 4mg.  Also had 4mg 6/13 & 14th.  Take 1 tablet (4 mg) by mouth daily. Adjust dose based on INR results as directed.      )       Labs:  Recent Results (from the past 240 hour(s))   INR   Result Value Ref Range    INR 1.35 (H) 0.90 - 1.10   Magnesium   Result Value Ref Range    Magnesium 1.6 (L) 1.8 - 2.6 mg/dL   Basic Metabolic Panel   Result Value Ref Range    Sodium 137 136 - 145 mmol/L    Potassium 4.6 3.5 - 5.0 mmol/L    Chloride 103 98 - 107 mmol/L    CO2 29 22 - 31 mmol/L    Anion Gap, Calculation 5 5 - 18 mmol/L    Glucose 100 70 - 125 mg/dL    Calcium 8.5 8.5 - 10.5 mg/dL    BUN 21 8 - 28 mg/dL    Creatinine 0.69 0.60 - 1.10 mg/dL    GFR MDRD Af Amer >60 >60 mL/min/1.73m2    GFR MDRD Non Af Amer >60 >60 mL/min/1.73m2   Hemoglobin   Result Value Ref Range    Hemoglobin 7.2 (L) 12.0 - 16.0 g/dL   INR   Result Value Ref Range    INR 1.63 (H) 0.90 - 1.10   Magnesium   Result Value Ref Range    Magnesium 1.8 1.8 - 2.6 mg/dL   Basic Metabolic Panel   Result Value Ref Range    Sodium 139 136 - 145 mmol/L    Potassium 4.9 3.5 - 5.0 mmol/L    Chloride 101 98 - 107  mmol/L    CO2 31 22 - 31 mmol/L    Anion Gap, Calculation 7 5 - 18 mmol/L    Glucose 98 70 - 125 mg/dL    Calcium 8.8 8.5 - 10.5 mg/dL    BUN 22 8 - 28 mg/dL    Creatinine 0.75 0.60 - 1.10 mg/dL    GFR MDRD Af Amer >60 >60 mL/min/1.73m2    GFR MDRD Non Af Amer >60 >60 mL/min/1.73m2   Hemoglobin   Result Value Ref Range    Hemoglobin 7.5 (L) 12.0 - 16.0 g/dL   INR   Result Value Ref Range    INR 1.45 (H) 0.90 - 1.10   INR   Result Value Ref Range    INR 1.78 (H) 0.90 - 1.10   Magnesium   Result Value Ref Range    Magnesium 1.9 1.8 - 2.6 mg/dL   Thyroid Stimulating Hormone (TSH)   Result Value Ref Range    TSH 2.97 0.30 - 5.00 uIU/mL   Vitamin D, Total (25-Hydroxy)   Result Value Ref Range    Vitamin D, Total (25-Hydroxy) 19.7 (L) 30.0 - 80.0 ng/mL   INR   Result Value Ref Range    INR 2.11 (H) 0.90 - 1.10   HM1 (CBC with Diff)   Result Value Ref Range    WBC 11.9 (H) 4.0 - 11.0 thou/uL    RBC 2.83 (L) 3.80 - 5.40 mill/uL    Hemoglobin 8.6 (L) 12.0 - 16.0 g/dL    Hematocrit 29.4 (L) 35.0 - 47.0 %     (H) 80 - 100 fL    MCH 30.4 27.0 - 34.0 pg    MCHC 29.3 (L) 32.0 - 36.0 g/dL    RDW 14.6 (H) 11.0 - 14.5 %    Platelets 709 (H) 140 - 440 thou/uL    MPV 8.5 8.5 - 12.5 fL    Neutrophils % 79 (H) 50 - 70 %    Lymphocytes % 11 (L) 20 - 40 %    Monocytes % 7 2 - 10 %    Eosinophils % 3 0 - 6 %    Basophils % 1 0 - 2 %    Neutrophils Absolute 9.2 (H) 2.0 - 7.7 thou/uL    Lymphocytes Absolute 1.3 0.8 - 4.4 thou/uL    Monocytes Absolute 0.8 0.0 - 0.9 thou/uL    Eosinophils Absolute 0.3 0.0 - 0.4 thou/uL    Basophils Absolute 0.1 0.0 - 0.2 thou/uL   Basic Metabolic Panel   Result Value Ref Range    Sodium 137 136 - 145 mmol/L    Potassium 5.1 (H) 3.5 - 5.0 mmol/L    Chloride 99 98 - 107 mmol/L    CO2 27 22 - 31 mmol/L    Anion Gap, Calculation 11 5 - 18 mmol/L    Glucose 90 70 - 125 mg/dL    Calcium 9.4 8.5 - 10.5 mg/dL    BUN 25 8 - 28 mg/dL    Creatinine 0.91 0.60 - 1.10 mg/dL    GFR MDRD Af Amer >60 >60 mL/min/1.73m2     GFR MDRD Non Af Amer >60 >60 mL/min/1.73m2         Assessment:    ICD-10-CM    1. Status post total replacement of left hip Z96.642    2. Fall, sequela W19.XXXS    3. Low magnesium level R79.0    4. Hyperkalemia E87.5    5. Blood loss anemia D50.0    6. Acute deep vein thrombosis (DVT) of proximal vein of left lower extremity (H) I82.4Y2    7. Protein-calorie malnutrition, unspecified severity (H) E46        Plan:  RONNIE: follow up with orthopedics in 2 weeks.  Pain is improving.  Counseled patient to continue to move and using the pain medications to assist with improve mobility.  Continue with oxycodone 5 to 10 mg every 3-4 hours we discussed stretching this out within the next week.  Hydroxyzine is now PRN.     Falls: No falls at the facility.    Vitamin D deficiency: Continue with calcium plus vitamin D but will also start on vitamin D 50,000 international units weekly x4 weeks then change to 1000 international units daily.  Will recheck a vitamin D in 1 month.  Will discontinue vitamin D draw that scheduled for tomorrow.    DVT: Continue on Coumadin 4 mg daily and is going to have an INR checked tomorrow.  Will need IVC filter removed in the near future however I would like to hold off on that until she is finished with rehab.    Low magnesium: Resolved    Hyperkalemia: Resolved we will continue to monitor by rechecking BMP next week.    Anemia: Improving will have a CBC done tomorrow with a ferritin level.    Malnutrition: DC boost breeze per patient preference and start on Ensure supplement.  Dietitian following.      Electronically signed by: Argenis Vera, CNP

## 2021-05-30 NOTE — PROGRESS NOTES
Code Status:  FULL CODE  Visit Type: Discharge Summary     Facility:  [facility] [[id number]]          PCP:  Elton Potts MD  [phone]       Admission Date to our Facility: 6/17/2019 discharge Date from our Facility: 7/3/2019    Discharge Diagnosis:    1. Status post total replacement of left hip     2. Acute deep vein thrombosis (DVT) of proximal vein of left lower extremity (H)     3. Blood loss anemia     4. Hyperkalemia     5. Vitamin D deficiency     6. Protein-calorie malnutrition, unspecified severity (H)          History of Present Illness: Vnaita Pete is a 74 y.o. female who has a past medical history of anemia, distant hx of alcohol abuse, and malnutrition.  She was hospitalized at [place] 6/11/2019 to 6/17/2019 multiple falls at home.  She had reported falling 3 weeks ago with left pain but thought it would improve so she was not evaluated.  Subsequently she had more falls and pain became unbearable so she went to the ER.  She was to have a la left hip fracture and underwent a left RONNIE. Prior to surgery she was found to have a Left LE acute DVT and an IVC filter was placed. She was placed on coumadin postoperatively.  Post operatively her issues were pain management and electrolyte imbalance including hyperkalemia and hypomagnesemia.  These both resolved with replacement and she was discharged without oral supplements. 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  Neurosurgery recommended a repeat CT in 2 months.     Skilled Nursing Facility Course: While at the TCU she progressed to ambulating with a wheeled walker and standby assist.  She has been resistant to any new medications or treatments and frequently tells me she does not plan to follow-up in regards to her possible osteoporosis, frontal hyperostosis, and anemia needing a colonoscopy.  She continues to use oxycodone frequently throughout the day for her left hip pain.  I did discuss  with her weaning down on this in the next week.  Today, I will decrease her use to 5 to 10 mg every 6 hours and would recommend decreasing that to 5 mg next week.  She continues on calcium plus vitamin D and high-dose vitamin D for vitamin D deficiency and presumed osteoporosis.  She states that she does not plan to get a DEXA scan in the near future.  She also refuses to take any alendronate.    She continues to have significant amount of lower extremity edema of her left lower extremity however this has improved in the past week.  She is concerned with her upper thigh edema and I have explained to her that this is likely related to her DVT and will take some time as that improves.  She is requesting a diuretic today and so I will give her Lasix 10 mg today and tomorrow and recommend that she follow-up with her primary provider in the future.     I also strongly encouraged her to follow-up with her primary provider next week in regards to her INR and Coumadin dosing.  Currently she is stable on 4 mg of Coumadin every day, however I am concerned with ongoing use and her potential to be lost to follow-up.  I did explain that her Coumadin would likely need to continue for the next 6 months. She verbalizes that she will plan to have her INR checked in clinic next Wednesday when she sees her primary provider. She may likely be a better candidate for for Xarelto however I be concerned with her ongoing unknown cause of anemia.  She will need to also follow-up for removal of her IVC filter in the next 2 months.    While at the TCU she did have complications with mild hyper hyperkalemia in which she did receive Kayexalate last week with her potassium returning to normal.  Yesterday her potassium was 5.0 so I recommended that she have a low potassium diet.  I did discuss with her high potassium foods to avoid.    Discharge Medications:    Current Outpatient Medications   Medication Sig Dispense Refill     calcium  carbonate-vitamin D3 (OS-WILLIAM 250+ D) 250-125 mg-unit Tab per tablet Take 1 tablet by mouth 2 (two) times a day.       [START ON 7/25/2019] cholecalciferol, vitamin D3, 1,000 unit tablet Take 1,000 Units by mouth daily.       ergocalciferol (ERGOCALCIFEROL) 50,000 unit capsule Take 50,000 Units by mouth once a week.       ferrous sulfate 325 (65 FE) MG tablet Take 1 tablet by mouth daily with breakfast.       ferrous sulfate 65 mg elemental iron Take 1 tablet by mouth daily with breakfast.       acetaminophen (TYLENOL) 500 MG tablet Take 2 tablets (1,000 mg total) by mouth 3 (three) times a day.  0     hydrOXYzine HCl (ATARAX) 25 MG tablet Take 25 mg by mouth every 6 (six) hours as needed for itching.       oxyCODONE (ROXICODONE) 5 MG immediate release tablet Take 1-2 tablets (5-10 mg total) by mouth every 3 (three) hours as needed. (Patient taking differently: Take 5-10 mg by mouth every 6 (six) hours as needed.       ) 20 tablet 0     senna-docusate (PERICOLACE) 8.6-50 mg tablet Take 1 tablet by mouth 2 (two) times a day.  0     warfarin sodium (WARFARIN ORAL) Take 4 mg by mouth daily. 7/1/19 INR 1.91  Cont 4mg daily.  Next INR 7/10/19 with PMD.             No current facility-administered medications for this visit.        For most current and accurate medication list, please contact the skilled nursing facility that this patient visit took place at.      Discharge Plan: Patient is stable to discharge to home and has refused any home care services at this time stating she we will plan to follow-up with her primary provider in the next week.  She will need to follow-up with her primary provider in the next 7 days for ongoing Coumadin management, INR draw, management of potential osteopenia/osteoporosis, recommendations for her anemia including a colonoscopy, follow-up for IVC filter removal and follow-up for hyperkalemia.  She will also need to continue to follow-up with orthopedics as recommended.    Review of  Systems   Patient denies fever, chills, headache, lightheadedness, dizziness, rhinorrhea, cough, congestion, shortness of breath, chest pain, palpitations, abdominal pain, n/v, diarrhea, constipation, change in appetite, dysuria, frequency, burning or pain with urination.  Other than stated in HPI all other review of systems is negative.         Physical Exam   Vital signs: /60, heart rate 91, respiratory 18, temp 98.2.  GENERAL APPEARANCE: Thin, well nourished, in no acute distress.  HEENT: normocephalic, atraumatic  PERRL, sclerae anicteric, conjunctivae clear and moist, EOM intact  LUNGS: Lung sounds CTA, no adventitious sounds, respiratory effort normal.  CARD: RRR, S1, S2, without murmurs, gallops, rubs,   ABD: Soft and nontender with normal bowel sounds.   MSK: Muscle strength and tone were normal.  EXTREMITIES: Nonpitting soft edema of left lower extremity wearing FAREED hose  NEURO: Alert and oriented x 3.  Face is symmetric.  SKIN: Incision of left hip is well approximated with no signs of symptoms of infection or drainage.  PSYCH: euthymic          Labs:   Recent Results (from the past 240 hour(s))   Magnesium   Result Value Ref Range    Magnesium 1.9 1.8 - 2.6 mg/dL   Thyroid Stimulating Hormone (TSH)   Result Value Ref Range    TSH 2.97 0.30 - 5.00 uIU/mL   Vitamin D, Total (25-Hydroxy)   Result Value Ref Range    Vitamin D, Total (25-Hydroxy) 19.7 (L) 30.0 - 80.0 ng/mL   INR   Result Value Ref Range    INR 2.11 (H) 0.90 - 1.10   HM1 (CBC with Diff)   Result Value Ref Range    WBC 11.9 (H) 4.0 - 11.0 thou/uL    RBC 2.83 (L) 3.80 - 5.40 mill/uL    Hemoglobin 8.6 (L) 12.0 - 16.0 g/dL    Hematocrit 29.4 (L) 35.0 - 47.0 %     (H) 80 - 100 fL    MCH 30.4 27.0 - 34.0 pg    MCHC 29.3 (L) 32.0 - 36.0 g/dL    RDW 14.6 (H) 11.0 - 14.5 %    Platelets 709 (H) 140 - 440 thou/uL    MPV 8.5 8.5 - 12.5 fL    Neutrophils % 79 (H) 50 - 70 %    Lymphocytes % 11 (L) 20 - 40 %    Monocytes % 7 2 - 10 %     Eosinophils % 3 0 - 6 %    Basophils % 1 0 - 2 %    Neutrophils Absolute 9.2 (H) 2.0 - 7.7 thou/uL    Lymphocytes Absolute 1.3 0.8 - 4.4 thou/uL    Monocytes Absolute 0.8 0.0 - 0.9 thou/uL    Eosinophils Absolute 0.3 0.0 - 0.4 thou/uL    Basophils Absolute 0.1 0.0 - 0.2 thou/uL   Basic Metabolic Panel   Result Value Ref Range    Sodium 137 136 - 145 mmol/L    Potassium 5.1 (H) 3.5 - 5.0 mmol/L    Chloride 99 98 - 107 mmol/L    CO2 27 22 - 31 mmol/L    Anion Gap, Calculation 11 5 - 18 mmol/L    Glucose 90 70 - 125 mg/dL    Calcium 9.4 8.5 - 10.5 mg/dL    BUN 25 8 - 28 mg/dL    Creatinine 0.91 0.60 - 1.10 mg/dL    GFR MDRD Af Amer >60 >60 mL/min/1.73m2    GFR MDRD Non Af Amer >60 >60 mL/min/1.73m2   Hemoglobin   Result Value Ref Range    Hemoglobin 8.5 (L) 12.0 - 16.0 g/dL   Potassium   Result Value Ref Range    Potassium 5.1 (H) 3.5 - 5.0 mmol/L   Ferritin   Result Value Ref Range    Ferritin 258 (H) 10 - 130 ng/mL   INR   Result Value Ref Range    INR 2.29 (H) 0.90 - 1.10   Potassium   Result Value Ref Range    Potassium 4.5 3.5 - 5.0 mmol/L   Folate, Serum   Result Value Ref Range    Folate 17.2 >=3.5 ng/mL   Potassium   Result Value Ref Range    Potassium 5.0 3.5 - 5.0 mmol/L   Vitamin B12   Result Value Ref Range    Vitamin B-12 348 213 - 816 pg/mL   INR   Result Value Ref Range    INR 1.91 (H) 0.90 - 1.10   HM1 (CBC with Diff)   Result Value Ref Range    WBC 6.4 4.0 - 11.0 thou/uL    RBC 3.17 (L) 3.80 - 5.40 mill/uL    Hemoglobin 9.1 (L) 12.0 - 16.0 g/dL    Hematocrit 32.2 (L) 35.0 - 47.0 %     (H) 80 - 100 fL    MCH 28.7 27.0 - 34.0 pg    MCHC 28.3 (L) 32.0 - 36.0 g/dL    RDW 14.6 (H) 11.0 - 14.5 %    Platelets 773 (H) 140 - 440 thou/uL    MPV 8.5 8.5 - 12.5 fL    Neutrophils % 54 50 - 70 %    Lymphocytes % 29 20 - 40 %    Monocytes % 12 (H) 2 - 10 %    Eosinophils % 4 0 - 6 %    Basophils % 1 0 - 2 %    Neutrophils Absolute 3.4 2.0 - 7.7 thou/uL    Lymphocytes Absolute 1.9 0.8 - 4.4 thou/uL    Monocytes  Absolute 0.8 0.0 - 0.9 thou/uL    Eosinophils Absolute 0.3 0.0 - 0.4 thou/uL    Basophils Absolute 0.1 0.0 - 0.2 thou/uL         Assessment:  1. Status post total replacement of left hip     2. Acute deep vein thrombosis (DVT) of proximal vein of left lower extremity (H)     3. Blood loss anemia     4. Hyperkalemia     5. Vitamin D deficiency     6. Protein-calorie malnutrition, unspecified severity (H)         MEDICAL EQUIPMENT NEEDS:  NA          35 total minutes spent with 20 minutes spent face-to-face with patient in counseling regarding her follow-ups, recommendations for medications regarding her osteoporosis, anemia, vitamin D deficiency, DVT and anticoagulation, recommendations for colonoscopy.    Electronically signed by: Argenis Vera CNP

## 2021-05-30 NOTE — TELEPHONE ENCOUNTER
[facility] Nurse Triage Anticoagulation Note      Provider: ADRIANA Guzman  Facility: [facility]    Facility Type: TCU    Caller: Argenis  Call Back Number:  [phone]    Reason for call: INR    Today s INR: 1.91  Previous INR: 6/26 2.29(4mg daily), 6/24 2.11(4mg daily)    Diagnosis/Goal: DVT Prophylaxis; Goal 1.8-2.5  Heparin/Lovenox: No   Currently on ABX: No  Other interacting Medications: None  Missed or refused doses: No    Nurse also reporting Heme 1, B12, K+, and folate levels.      Verbal Order/Direction given by Provider: Warfarin 4mg daily.  Check INR 7/8/19 with primary MD.  Low potassium diet.      Provider giving order: ADRIANA Guzman    Verbal order given to: Argenis Becker RN

## 2021-05-30 NOTE — PROGRESS NOTES
[facility]      Facility:    [facility] [[id number]]  Code Status: FULL CODE      Chief Complaint/Reason for Visit:  Chief Complaint   Patient presents with     Follow Up       HPI:   Vanita is a 74 y.o. female who was seen for a TCU visit. She has a past medical history of anemia, distant hx of alcohol abuse, and malnutrition.  She was hospitalized at [facility] 6/11/2019 to 6/17/2019 multiple falls at home.  She had reported falling 3 weeks ago with left pain but thought it would improve so she was not evaluated.  Subsequently she had more falls and pain became unbearable so she went to the ER.  She was to have a la left hip fracture and underwent a left RONNIE. Prior to surgery she was found to have a Left LE acute DVT and an IVC filter was placed. She was placed on coumadin postoperatively.  Post operatively her issues were pain management and electrolyte imbalance including hyperkalemia and hypomagnesemia.  These both resolved with replacement and she was discharged without oral supplements. 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  Neurosurgery recommended a repeat CT in 2 months.     Today, she has lots of questions regarding the recheck on her potassium today.  Yesterday, her BMP showed a K of 5.1.  She was given Kayexalate 15gm last night and recheck of K was 4.5.  She is not on any potassium supplements.  She denies any palpitations or chest pain.  She reports her pain is stable on the oxycodone prn.  She continues to have +1 pitting edema of her left lower extremity and she is requesting a diuretic.  She has good cms to her lower extremity with strong pedal pulse.     Her hgb checked yesterday is stable at 8.5.  She had good ferritin level and is on FeSo4 supplement.  She has not had a vit B12 checked recently.     Past Medical History:  Past Medical History:   Diagnosis Date     DVT (deep vein thrombosis) in pregnancy (H)       Fractured hip, left, closed, initial encounter (H)            Surgical History:  Past Surgical History:   Procedure Laterality Date     IR IVC FILTER PLACEMENT  6/11/2019     NO PAST SURGERIES       VA TOTAL HIP ARTHROPLASTY Left 6/11/2019    Procedure: ARTHROPLASTY, LEFT HIP, TOTAL;  Surgeon: Andre Espinal MD;  Location: [facility];  Service: Orthopedics       Family History:   No family history on file.    Social History:    Social History     Socioeconomic History     Marital status:      Spouse name: Not on file     Number of children: Not on file     Years of education: Not on file     Highest education level: Not on file   Occupational History     Not on file   Social Needs     Financial resource strain: Not on file     Food insecurity:     Worry: Not on file     Inability: Not on file     Transportation needs:     Medical: Not on file     Non-medical: Not on file   Tobacco Use     Smoking status: Current Every Day Smoker     Smokeless tobacco: Current User     Tobacco comment: smoke E-cigarettes   Substance and Sexual Activity     Alcohol use: No     Comment: former alcoholic; sober x 14 years     Drug use: No     Sexual activity: Not on file   Lifestyle     Physical activity:     Days per week: Not on file     Minutes per session: Not on file     Stress: Not on file   Relationships     Social connections:     Talks on phone: Not on file     Gets together: Not on file     Attends [employer] service: Not on file     Active member of club or organization: Not on file     Attends meetings of clubs or organizations: Not on file     Relationship status: Not on file     Intimate partner violence:     Fear of current or ex partner: Not on file     Emotionally abused: Not on file     Physically abused: Not on file     Forced sexual activity: Not on file   Other Topics Concern     Not on file   Social History Narrative    , daughter   Has 7 children        Review of Systems   Patient denies  fever, chills, headache, lightheadedness, dizziness, rhinorrhea, cough, congestion, shortness of breath, chest pain, palpitations, abdominal pain, n/v, diarrhea, constipation, change in appetite, dysuria, frequency, burning or pain with urination.  Other than stated in HPI all other review of systems is negative.             Physical Exam     Vital signs:/61, HR 92, resp 24, temp 97.7  GENERAL APPEARANCE: Thin elderly female in no acute distress.  HEENT: normocephalic, atraumatic  PERRL, sclerae anicteric, conjunctivae clear and moist, EOM intact  LUNGS: Lung sounds CTA, no adventitious sounds, respiratory effort normal.  CARD: RRR, S1, S2, without murmurs, gallops, rubs, no JVD,   ABD: Soft and nontender with normal bowel sounds.   MSK: Muscle strength and tone were normal.  EXTREMITIES: 1+ pitting edema of left lower extremity, right lower extremity with soft nonpitting edema.  Good CMS bilaterally LE +pedal pulses  NEURO: Alert and oriented x 3. Face is symmetric.  SKIN: Left hip incision is well approximated without sutures or staples.  No drainage, signs and symptoms of infection  PSYCH: euthymic          Medication List:  Current Outpatient Medications   Medication Sig     acetaminophen (TYLENOL) 500 MG tablet Take 2 tablets (1,000 mg total) by mouth 3 (three) times a day.     hydrOXYzine HCl (ATARAX) 25 MG tablet Take 25 mg by mouth every 6 (six) hours as needed for itching.     oxyCODONE (ROXICODONE) 5 MG immediate release tablet Take 1-2 tablets (5-10 mg total) by mouth every 3 (three) hours as needed.     senna-docusate (PERICOLACE) 8.6-50 mg tablet Take 1 tablet by mouth 2 (two) times a day.     warfarin (COUMADIN/JANTOVEN) 4 MG tablet Take 1 tablet (4 mg total) by mouth daily. Take 1 tablet (4 mg) by mouth daily. Adjust dose based on INR results as directed. (Patient taking differently: Take 4 mg by mouth daily. 6/18/19 INR 1.45 4mg daily. NExt INR 6/20.  6/17/19 INR 1.63 4mg  6/16/19 INR 1.35 had  4mg  6/15/19 INR 1.13 had 4mg.  Also had 4mg 6/13 & 14th.  Take 1 tablet (4 mg) by mouth daily. Adjust dose based on INR results as directed.      )       Labs:  Recent Results (from the past 240 hour(s))   INR   Result Value Ref Range    INR 1.45 (H) 0.90 - 1.10   INR   Result Value Ref Range    INR 1.78 (H) 0.90 - 1.10   Magnesium   Result Value Ref Range    Magnesium 1.9 1.8 - 2.6 mg/dL   Thyroid Stimulating Hormone (TSH)   Result Value Ref Range    TSH 2.97 0.30 - 5.00 uIU/mL   Vitamin D, Total (25-Hydroxy)   Result Value Ref Range    Vitamin D, Total (25-Hydroxy) 19.7 (L) 30.0 - 80.0 ng/mL   INR   Result Value Ref Range    INR 2.11 (H) 0.90 - 1.10   HM1 (CBC with Diff)   Result Value Ref Range    WBC 11.9 (H) 4.0 - 11.0 thou/uL    RBC 2.83 (L) 3.80 - 5.40 mill/uL    Hemoglobin 8.6 (L) 12.0 - 16.0 g/dL    Hematocrit 29.4 (L) 35.0 - 47.0 %     (H) 80 - 100 fL    MCH 30.4 27.0 - 34.0 pg    MCHC 29.3 (L) 32.0 - 36.0 g/dL    RDW 14.6 (H) 11.0 - 14.5 %    Platelets 709 (H) 140 - 440 thou/uL    MPV 8.5 8.5 - 12.5 fL    Neutrophils % 79 (H) 50 - 70 %    Lymphocytes % 11 (L) 20 - 40 %    Monocytes % 7 2 - 10 %    Eosinophils % 3 0 - 6 %    Basophils % 1 0 - 2 %    Neutrophils Absolute 9.2 (H) 2.0 - 7.7 thou/uL    Lymphocytes Absolute 1.3 0.8 - 4.4 thou/uL    Monocytes Absolute 0.8 0.0 - 0.9 thou/uL    Eosinophils Absolute 0.3 0.0 - 0.4 thou/uL    Basophils Absolute 0.1 0.0 - 0.2 thou/uL   Basic Metabolic Panel   Result Value Ref Range    Sodium 137 136 - 145 mmol/L    Potassium 5.1 (H) 3.5 - 5.0 mmol/L    Chloride 99 98 - 107 mmol/L    CO2 27 22 - 31 mmol/L    Anion Gap, Calculation 11 5 - 18 mmol/L    Glucose 90 70 - 125 mg/dL    Calcium 9.4 8.5 - 10.5 mg/dL    BUN 25 8 - 28 mg/dL    Creatinine 0.91 0.60 - 1.10 mg/dL    GFR MDRD Af Amer >60 >60 mL/min/1.73m2    GFR MDRD Non Af Amer >60 >60 mL/min/1.73m2   Hemoglobin   Result Value Ref Range    Hemoglobin 8.5 (L) 12.0 - 16.0 g/dL   Potassium   Result Value Ref  Range    Potassium 5.1 (H) 3.5 - 5.0 mmol/L   Ferritin   Result Value Ref Range    Ferritin 258 (H) 10 - 130 ng/mL   INR   Result Value Ref Range    INR 2.29 (H) 0.90 - 1.10   Potassium   Result Value Ref Range    Potassium 4.5 3.5 - 5.0 mmol/L         Assessment:    ICD-10-CM    1. Hyperkalemia E87.5    2. Acute deep vein thrombosis (DVT) of proximal vein of left lower extremity (H) I82.4Y2    3. Blood loss anemia D50.0    4. Status post total replacement of left hip Z96.642        Plan:  Hyperkalemia: resolve after 1 dose of kayexalate.  Continue to monitor and check K early next week.  Counseled patient on s/s of hyperkalemia, medication uses and expected responses.     DVT: counseled patient on timing of resolution of edema likely related to her DVT.  Explained that I would like to wait to give her a diuretic as with all medications there are side effects.     Anemia: stable; recheck CBC next week.  Will check B12 today.  I counseled patient on the importance of having a colonoscopy and she states she will not have a colonoscopy.  Again, I urged her to have this done to look for any source of bleeding causing her hgb to go down.       RONNIE: continue with therapies.  Oxycodone for pain. Will discuss weaning her oxycodone next week prior to discharge.         Electronically signed by: Argenis Vera, LESVIA

## 2021-05-30 NOTE — PROGRESS NOTES
[facility]        Visit Type: H & P (Left hip fracture status post L RONNIE)    Code Status:  FULL CODE  Facility:  [facility] [[id number]]          PCP: Elton Potts MD       PHONE: [phone]     FAX:[phone]        ASSESSMENT/PLAN:  1. Status post total replacement of left hip   improving.  Continue PT/OT, pain medications including APAP, hydroxyzine as needed, oxycodone as needed.  A very long discussion was done with the patient regarding narcotic use and the need to taper narcotics in the next week or so.  A long discussion was also done regarding osteoporosis and need for bone DEXA scan as an outpatient and starting bisphosphonates.  Patient states that she will follow-up with primary MD regarding scheduling a bone DEXA scan   2. Blood loss anemia   multifactorial with current surgery exacerbating chronic anemia of unclear cause.  Hemoglobin on 4/3/2019 is at 8.3.  Will check iron studies and recheck hemoglobin on 6/27.  Consider colonoscopy as outpatient.  We will also check B12 levels   3. Acute deep vein thrombosis (DVT) of proximal vein of left lower extremity (H)   will need to follow-up with IR regarding filter with removal after 2 months.  INR at 2.11, will continue 4 mg of Coumadin with INR goal 1.8-2.5 in this patient who has anemia.  Check INR on 6/26   4. Hyperkalemia   potassium at 5.1 with previous potassium at 4.9 on 6/17.  Avoid potassium rich foods, recheck potassium level on 6/26   5. Protein-calorie malnutrition, unspecified severity (H)   dietitian to see.  Patient is not as antispastic regarding her nutrition status since she she states that her weight has been normal for her at baseline.  I did explain to her that it is important to build her nutrition and strength to prevent further deconditioning and falls.  Albumin on 4/1/2019 is at 1.7   6. Pressure injury of coccygeal region, stage 2   continue Mepilex Q 5 days   7.       Abnormal head CT with hyperostosis-repeat head CT in 2 months per neurosurgery recommendation      HISTORY OF PRESENT ILLNESS:   Vanita Pete is a 74 y.o. female with a history of CHAN now with normal renal function, anxiety disorder, eating disorder with malnutrition, distant history of EtOH abuse, anemia who was needed for a closed left hip fracture.  According to the patient, she had multiple falls at home but her first fall was approximately 4 weeks ago where she noted mild pain on her left hip.  She noted gradual worsening of her pain and subsequent falls which brought her to the ER.  She underwent left RONNIE on 6/11/2019.  Prior to her operation, the patient was also noted to have increased left leg swelling and ultrasound showed left peroneal vein DVT.  An IVC filter was placed on 6/11/2019.  Postoperatively, the patient did not have any other complications and the patient was started on Coumadin for her DVT, she was also started on PT/OT with orthopedic recommendations of WBAT on LLE.  Of note is that there was an incidental finding of an abnormal widening of the right frontal bone with hyperostosis on CT scan of the head in the ER.  Neurosurgery was consulted and recommendation is for repeat imaging in 2 months.    Currently, the patient states that her pain is better when given oxycodone every 3 hours as needed.  She notes that she continues to have left leg swelling although denies any pain in this area.  She does have anemia which has been chronic but denies any weakness or rectal bleeding.  Her appetite is fairly stable and she states that she sleeps fine.  She denies any abdominal pain, diarrhea or constipation, urinary symptoms.  She does not have any fevers or chills, chest pains or increased shortness of breath.  She does not have any palpitations.  Not have any dizziness or lightheadedness.    Other review of systems are negative.          PAST MEDICAL/SURGICAL HISTORY:  Past Medical History:  "  Diagnosis Date     DVT (deep vein thrombosis) in pregnancy (H)      Fractured hip, left, closed, initial encounter (H)      Past Surgical History:   Procedure Laterality Date     IR IVC FILTER PLACEMENT  6/11/2019     NO PAST SURGERIES       NE TOTAL HIP ARTHROPLASTY Left 6/11/2019    Procedure: ARTHROPLASTY, LEFT HIP, TOTAL;  Surgeon: Andre Espinal MD;  Location: [facility];  Service: Orthopedics       SOCIAL HISTORY: He is  and lives with her , no alcohol use since 15 years ago, smoker/\"a few cigarettes per day\"      FAMILY HISTORY:  No family history on file.    MEDICATIONS:  Current Outpatient Medications on File Prior to Visit   Medication Sig Dispense Refill     acetaminophen (TYLENOL) 500 MG tablet Take 2 tablets (1,000 mg total) by mouth 3 (three) times a day.  0     hydrOXYzine HCl (ATARAX) 25 MG tablet Take 25 mg by mouth every 6 (six) hours as needed for itching.       oxyCODONE (ROXICODONE) 5 MG immediate release tablet Take 1-2 tablets (5-10 mg total) by mouth every 3 (three) hours as needed. 20 tablet 0     senna-docusate (PERICOLACE) 8.6-50 mg tablet Take 1 tablet by mouth 2 (two) times a day.  0     warfarin (COUMADIN/JANTOVEN) 4 MG tablet Take 1 tablet (4 mg total) by mouth daily. Take 1 tablet (4 mg) by mouth daily. Adjust dose based on INR results as directed. (Patient taking differently: Take 4 mg by mouth daily. 6/18/19 INR 1.45 4mg daily. NExt INR 6/20.  6/17/19 INR 1.63 4mg  6/16/19 INR 1.35 had 4mg  6/15/19 INR 1.13 had 4mg.  Also had 4mg 6/13 & 14th.  Take 1 tablet (4 mg) by mouth daily. Adjust dose based on INR results as directed.      ) 30 tablet 0     No current facility-administered medications on file prior to visit.        ALLERGIES:  No Known Allergies      PHYSICAL EXAMINATION:  Vital signs 108/56, 97.3, 78, 103 pounds, 16, 93% room air  General: Awake, Alert, oriented x3, not in any form of acute distress, answers questions appropriately, follows simple " commands, conversant, thin  HEENT: Pink conjunctiva, anicteric sclerae, oral mucosa is moist  NECK: Supple, without any lymphadenopathy, thyromegaly or any masses  LUNG: Clear to auscultation, good chest expansion. There are no crackles, no wheezes, normal AP diameter  BACK: No kyphosis of the thoracic spine  CVS: There is good S1  S2, there are no murmurs, no heaves, rhythm is regular  ABDOMEN: Globular and soft, nontender to palpation, no organomegaly, good bowel sounds  EXTREMITIES: Good range of motion on both upper and lower extremities except on left hip where his decrease R OM and with left surgical site that is clean, without any discharge or redness but with minimal swelling, no pedal edema on the right, +1-2 pedal edema on the left, no cyanosis or clubbing, no calf tenderness  SKIN: Warm and dry, no rashes or erythema noted    LABS:  Lab Results   Component Value Date    WBC 11.9 (H) 06/24/2019    HGB 8.5 (L) 06/26/2019    HCT 29.4 (L) 06/24/2019     (H) 06/24/2019     (H) 06/24/2019     Lab Results   Component Value Date    CREATININE 0.91 06/24/2019    BUN 25 06/24/2019     06/24/2019    K 5.1 (H) 06/26/2019    CL 99 06/24/2019    CO2 27 06/24/2019           >45 minutes of total time spent, greater than 55% of the time spent in coordination of care and counseling regarding the above medical issues and plan of care long discussion regarding medications and current medical status as discussed in plans above. I have reviewed the patient's medical records, labs and medications.       Electronically signed by:Ruth Yost MD

## 2021-06-03 VITALS — HEIGHT: 65 IN | WEIGHT: 103.8 LBS | BODY MASS INDEX: 17.29 KG/M2

## 2021-06-09 NOTE — ANESTHESIA CARE TRANSFER NOTE
Last vitals:   Vitals:    03/29/17 0020   BP: 108/55   Pulse: 95   Resp: 18   Temp: 36.2  C (97.1  F)   SpO2: 99%     Patient's level of consciousness is drowsy  Spontaneous respirations: yes  Maintains airway independently: yes  Dentition unchanged: yes  Oropharynx: oropharynx clear of all foreign objects    QCDR Measures:  ASA# 20 - Surgical Safety Checklist: ASA20A - Safety Checks Done  PQRS# 430 - Adult PONV Prevention: 4558F - Pt received => 2 anti-emetic agents (different classes) preop & intraop  ASA# 8 - Peds PONV Prevention: NA - Not pediatric patient, not GA or 2 or more risk factors NOT present  PQRS# 424 - Naina-op Temp Management: 4559F - At least one body temp DOCUMENTED => 35.5C or 95.9F within required timeframe  PQRS# 426 - PACU Transfer Protocol: - Transfer of care checklist used  ASA# 14 - Acute Post-op Pain: ASA14B - Patient did NOT experience pain >= 7 out of 10    I completed my SBAR handoff to the receiving nurse per policy and procedure.

## 2021-06-09 NOTE — ANESTHESIA PREPROCEDURE EVALUATION
Anesthesia Evaluation      Patient summary reviewed     Airway   Mallampati: II  Neck ROM: full   Pulmonary      ROS comment: ABG   Acidosis,  Ph  7.13 metabolic                           Cardiovascular - negative ROS  ECG reviewed        Neuro/Psych      Comments: Alert oriented    Endo/Other       GI/Hepatic/Renal    (+)   chronic renal disease ARF, impaired hepatic function     Other findings: Sepsis   Hand cellulitis  INR  1.27  BUN  93  Creat  4.13  K+  3.4  Na+  1.27  Abn  LFT      Dental                         Anesthesia Plan  Planned anesthetic: general LMA  Consider Gen endo  May require post op vent  ICU for intensivist management  Sepsis, Hepato-Renal, metabolic management  ASA 3 - emergent   Induction: intravenous   Anesthetic plan and risks discussed with: patient    Post-op plan: extended intubation/vent support and routine recovery

## 2021-06-09 NOTE — ANESTHESIA POSTPROCEDURE EVALUATION
Patient: Vanita Pete  IRRIGATION AND DEBRIDEMENT RIGHT HAND  Anesthesia type: general    Patient location: PACU  Last vitals:   Vitals:    03/29/17 0110   BP: 95/50   Pulse: 91   Resp: 18   Temp:    SpO2: 96%     Post vital signs: stable  Level of consciousness: awake and responds to simple questions  Post-anesthesia pain: pain controlled  Post-anesthesia nausea and vomiting: no  Pulmonary: unassisted, return to baseline  Cardiovascular: stable and blood pressure at baseline  Hydration: adequate  Anesthetic events: no    QCDR Measures:  ASA# 11 - Naina-op Cardiac Arrest: ASA11B - Patient did NOT experience unanticipated cardiac arrest  ASA# 12 - Naina-op Mortality Rate: ASA12B - Patient did NOT die  ASA# 13 - PACU Re-Intubation Rate: ASA13B - Patient did NOT require a new airway mgmt  ASA# 10 - Composite Anes Safety: ASA10A - No serious adverse event  ASA# 38 - New Corneal Injury: ASA38A - No new exposure keratitis or corneal abrasion in PACU    Additional Notes: Planned ICU admission for management of sepsis, and major blood chemistry management, electrolyte, acid-base                              Renal, Hepatic.  Pt. Alert and responsive in PACU.  No respiratory impairment.

## 2021-06-15 PROBLEM — F50.9 EATING DISORDER: Status: ACTIVE | Noted: 2017-03-29
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
## 2021-06-15 PROBLEM — E87.20 METABOLIC ACIDOSIS: Status: ACTIVE | Noted: 2017-03-29

## 2021-06-15 PROBLEM — S61.259A DOG BITE OF FINGER: Status: ACTIVE | Noted: 2017-03-29

## 2021-06-15 PROBLEM — F10.11 ALCOHOL ABUSE, IN REMISSION: Status: ACTIVE | Noted: 2017-03-29

## 2021-06-15 PROBLEM — D64.9 NORMOCYTIC ANEMIA: Status: ACTIVE | Noted: 2017-03-29

## 2021-06-15 PROBLEM — E83.51 HYPOCALCEMIA: Status: ACTIVE | Noted: 2017-03-29

## 2021-06-15 PROBLEM — T78.40XA SENSITIVITY TO MEDICATION: Status: ACTIVE | Noted: 2017-04-02

## 2021-06-15 PROBLEM — L02.511: Status: ACTIVE | Noted: 2017-03-29

## 2021-06-15 PROBLEM — N17.9 AKI (ACUTE KIDNEY INJURY) (H): Status: ACTIVE | Noted: 2017-03-29

## 2021-06-15 PROBLEM — G89.18 POSTOPERATIVE PAIN: Status: ACTIVE | Noted: 2017-03-29

## 2021-06-15 PROBLEM — M79.601 RIGHT ARM PAIN: Status: ACTIVE | Noted: 2017-03-29

## 2021-06-15 PROBLEM — A41.9 SEPSIS (H): Status: ACTIVE | Noted: 2017-03-28

## 2021-06-16 PROBLEM — R79.0 LOW MAGNESIUM LEVEL: Status: ACTIVE | Noted: 2019-06-14

## 2021-06-16 PROBLEM — D62 ANEMIA DUE TO BLOOD LOSS, ACUTE: Status: ACTIVE | Noted: 2019-06-14
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
## 2021-06-16 PROBLEM — S72.002A CLOSED LEFT HIP FRACTURE (H): Status: ACTIVE | Noted: 2019-06-11

## 2021-06-16 PROBLEM — S72.002A HIP FRACTURE, LEFT, CLOSED, INITIAL ENCOUNTER (H): Status: ACTIVE | Noted: 2019-06-11

## 2021-06-19 NOTE — LETTER
Letter by Argenis Vera CNP at      Author: Argenis Vera CNP Service: -- Author Type: --    Filed:  Encounter Date: 6/25/2019 Status: (Other)         Patient: Vanita Pete   MR Number: [id number]   YOB: 1945   Date of Visit: 6/25/2019     [facility]      Facility:    [facility] [[id number]]  Code Status: FULL CODE      Chief Complaint/Reason for Visit:  Chief Complaint   Patient presents with   ? Follow Up       HPI:   Vanita is a 74 y.o. female who was seen for an initial TCU visit. She has a past medical history of anemia, distant hx of alcohol abuse, and malnutrition.  She was hospitalized at [facility] 6/11/2019 to 6/17/2019 multiple falls at home.  She had reported falling 3 weeks ago with left pain but thought it would improve so she was not evaluated.  Subsequently she had more falls and pain became unbearable so she went to the ER.  She was to have a la left hip fracture and underwent a left RONNIE. Prior to surgery she was found to have a Left LE acute DVT and an IVC filter was placed. She was placed on coumadin postoperatively.  Post operatively her issues were pain management and electrolyte imbalance including hyperkalemia and hypomagnesemia.  These both resolved with replacement and she was discharged without oral supplements. 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  Neurosurgery recommended a repeat CT in 2 months.     Today, reports her pain is better managed with taking her oxycodone every 3-4 hours.  She is also seen in improvement in management after scheduling the hydroxyzine for 2 days last week.  She reports she did not take oxycodone at bedtime last night and she woke up in more pain this morning and so she will take that tonight.  Her incision is well approximated without any sutures or staples and show no signs and symptoms of infection I did instruct her that she did not need to have a  dressing and only to put one on for comfort if she felt she needed it.  Is good CMS to her extremities however does have significant swelling of her left extremity of 2+ pitting edema.  She does have good peripheral pulses.  She is progressing therapy and ambulating with a walker with standby assist however she feels she is not steady enough to be discharged until next week.      Her magnesium was drawn today and it was within normal limits also her BMP was drawn and also in with normal limits.  She did have a low vitamin D of 19.7 that was drawn on Monday.  Her hemoglobin was also drawn on Monday and was a little better at 8.6 from 7.5 at hospital discharge.    For her malnutrition she prefers to drink the Ensure's rather than the boost breezes and so this was discontinued.  She was started on calcium with vitamin D and iron yesterday by Dr. Yost.  Her TSH which was drawn yesterday was within normal limits of 2.97.    Past Medical History:  Past Medical History:   Diagnosis Date   ? DVT (deep vein thrombosis) in pregnancy (H)    ? Fractured hip, left, closed, initial encounter (H)            Surgical History:  Past Surgical History:   Procedure Laterality Date   ? IR IVC FILTER PLACEMENT  6/11/2019   ? NO PAST SURGERIES     ? PA TOTAL HIP ARTHROPLASTY Left 6/11/2019    Procedure: ARTHROPLASTY, LEFT HIP, TOTAL;  Surgeon: Andre Espinal MD;  Location: [facility];  Service: Orthopedics       Family History:   No family history on file.    Social History:    Social History     Socioeconomic History   ? Marital status:      Spouse name: Not on file   ? Number of children: Not on file   ? Years of education: Not on file   ? Highest education level: Not on file   Occupational History   ? Not on file   Social Needs   ? Financial resource strain: Not on file   ? Food insecurity:     Worry: Not on file     Inability: Not on file   ? Transportation needs:     Medical: Not on file     Non-medical: Not on file    Tobacco Use   ? Smoking status: Current Every Day Smoker   ? Smokeless tobacco: Current User   ? Tobacco comment: smoke E-cigarettes   Substance and Sexual Activity   ? Alcohol use: No     Comment: former alcoholic; sober x 14 years   ? Drug use: No   ? Sexual activity: Not on file   Lifestyle   ? Physical activity:     Days per week: Not on file     Minutes per session: Not on file   ? Stress: Not on file   Relationships   ? Social connections:     Talks on phone: Not on file     Gets together: Not on file     Attends Congregation service: Not on file     Active member of club or organization: Not on file     Attends meetings of clubs or organizations: Not on file     Relationship status: Not on file   ? Intimate partner violence:     Fear of current or ex partner: Not on file     Emotionally abused: Not on file     Physically abused: Not on file     Forced sexual activity: Not on file   Other Topics Concern   ? Not on file   Social History Narrative    , daughter   Has 7 children        Review of Systems   Patient denies fever, chills, headache, lightheadedness, dizziness, rhinorrhea, cough, congestion, shortness of breath, chest pain, palpitations, abdominal pain, n/v, diarrhea, constipation, change in appetite, dysuria, frequency, burning or pain with urination.  Other than stated in HPI all other review of systems is negative.             Physical Exam     Vital signs: /67, heart rate 108, respiratory 16, temp 97.9.   GENERAL APPEARANCE: Thin elderly female in no acute distress.  HEENT: normocephalic, atraumatic  PERRL, sclerae anicteric, conjunctivae clear and moist, EOM intact  LUNGS: Lung sounds CTA, no adventitious sounds, respiratory effort normal.  CARD: RRR, S1, S2, without murmurs, gallops, rubs, no JVD,   ABD: Soft and nontender with normal bowel sounds.   MSK: Muscle strength and tone were normal.  EXTREMITIES: 2+ pitting edema of left lower extremity, right lower extremity with soft  nonpitting edema.  Good CMS bilaterally LE  NEURO: Alert and oriented x 3. Face is symmetric.  SKIN: Left hip incision is well approximated without sutures or staples.  No drainage, signs and symptoms of infection..  Coccyx ulcer dressed with occlusive dressing.   PSYCH: euthymic          Medication List:  Current Outpatient Medications   Medication Sig   ? acetaminophen (TYLENOL) 500 MG tablet Take 2 tablets (1,000 mg total) by mouth 3 (three) times a day.   ? hydrOXYzine HCl (ATARAX) 25 MG tablet Take 25 mg by mouth every 6 (six) hours as needed for itching.   ? oxyCODONE (ROXICODONE) 5 MG immediate release tablet Take 1-2 tablets (5-10 mg total) by mouth every 3 (three) hours as needed.   ? senna-docusate (PERICOLACE) 8.6-50 mg tablet Take 1 tablet by mouth 2 (two) times a day.   ? warfarin (COUMADIN/JANTOVEN) 4 MG tablet Take 1 tablet (4 mg total) by mouth daily. Take 1 tablet (4 mg) by mouth daily. Adjust dose based on INR results as directed. (Patient taking differently: Take 4 mg by mouth daily. 6/18/19 INR 1.45 4mg daily. NExt INR 6/20.  6/17/19 INR 1.63 4mg  6/16/19 INR 1.35 had 4mg  6/15/19 INR 1.13 had 4mg.  Also had 4mg 6/13 & 14th.  Take 1 tablet (4 mg) by mouth daily. Adjust dose based on INR results as directed.      )       Labs:  Recent Results (from the past 240 hour(s))   INR   Result Value Ref Range    INR 1.35 (H) 0.90 - 1.10   Magnesium   Result Value Ref Range    Magnesium 1.6 (L) 1.8 - 2.6 mg/dL   Basic Metabolic Panel   Result Value Ref Range    Sodium 137 136 - 145 mmol/L    Potassium 4.6 3.5 - 5.0 mmol/L    Chloride 103 98 - 107 mmol/L    CO2 29 22 - 31 mmol/L    Anion Gap, Calculation 5 5 - 18 mmol/L    Glucose 100 70 - 125 mg/dL    Calcium 8.5 8.5 - 10.5 mg/dL    BUN 21 8 - 28 mg/dL    Creatinine 0.69 0.60 - 1.10 mg/dL    GFR MDRD Af Amer >60 >60 mL/min/1.73m2    GFR MDRD Non Af Amer >60 >60 mL/min/1.73m2   Hemoglobin   Result Value Ref Range    Hemoglobin 7.2 (L) 12.0 - 16.0 g/dL   INR    Result Value Ref Range    INR 1.63 (H) 0.90 - 1.10   Magnesium   Result Value Ref Range    Magnesium 1.8 1.8 - 2.6 mg/dL   Basic Metabolic Panel   Result Value Ref Range    Sodium 139 136 - 145 mmol/L    Potassium 4.9 3.5 - 5.0 mmol/L    Chloride 101 98 - 107 mmol/L    CO2 31 22 - 31 mmol/L    Anion Gap, Calculation 7 5 - 18 mmol/L    Glucose 98 70 - 125 mg/dL    Calcium 8.8 8.5 - 10.5 mg/dL    BUN 22 8 - 28 mg/dL    Creatinine 0.75 0.60 - 1.10 mg/dL    GFR MDRD Af Amer >60 >60 mL/min/1.73m2    GFR MDRD Non Af Amer >60 >60 mL/min/1.73m2   Hemoglobin   Result Value Ref Range    Hemoglobin 7.5 (L) 12.0 - 16.0 g/dL   INR   Result Value Ref Range    INR 1.45 (H) 0.90 - 1.10   INR   Result Value Ref Range    INR 1.78 (H) 0.90 - 1.10   Magnesium   Result Value Ref Range    Magnesium 1.9 1.8 - 2.6 mg/dL   Thyroid Stimulating Hormone (TSH)   Result Value Ref Range    TSH 2.97 0.30 - 5.00 uIU/mL   Vitamin D, Total (25-Hydroxy)   Result Value Ref Range    Vitamin D, Total (25-Hydroxy) 19.7 (L) 30.0 - 80.0 ng/mL   INR   Result Value Ref Range    INR 2.11 (H) 0.90 - 1.10   HM1 (CBC with Diff)   Result Value Ref Range    WBC 11.9 (H) 4.0 - 11.0 thou/uL    RBC 2.83 (L) 3.80 - 5.40 mill/uL    Hemoglobin 8.6 (L) 12.0 - 16.0 g/dL    Hematocrit 29.4 (L) 35.0 - 47.0 %     (H) 80 - 100 fL    MCH 30.4 27.0 - 34.0 pg    MCHC 29.3 (L) 32.0 - 36.0 g/dL    RDW 14.6 (H) 11.0 - 14.5 %    Platelets 709 (H) 140 - 440 thou/uL    MPV 8.5 8.5 - 12.5 fL    Neutrophils % 79 (H) 50 - 70 %    Lymphocytes % 11 (L) 20 - 40 %    Monocytes % 7 2 - 10 %    Eosinophils % 3 0 - 6 %    Basophils % 1 0 - 2 %    Neutrophils Absolute 9.2 (H) 2.0 - 7.7 thou/uL    Lymphocytes Absolute 1.3 0.8 - 4.4 thou/uL    Monocytes Absolute 0.8 0.0 - 0.9 thou/uL    Eosinophils Absolute 0.3 0.0 - 0.4 thou/uL    Basophils Absolute 0.1 0.0 - 0.2 thou/uL   Basic Metabolic Panel   Result Value Ref Range    Sodium 137 136 - 145 mmol/L    Potassium 5.1 (H) 3.5 - 5.0 mmol/L     Chloride 99 98 - 107 mmol/L    CO2 27 22 - 31 mmol/L    Anion Gap, Calculation 11 5 - 18 mmol/L    Glucose 90 70 - 125 mg/dL    Calcium 9.4 8.5 - 10.5 mg/dL    BUN 25 8 - 28 mg/dL    Creatinine 0.91 0.60 - 1.10 mg/dL    GFR MDRD Af Amer >60 >60 mL/min/1.73m2    GFR MDRD Non Af Amer >60 >60 mL/min/1.73m2         Assessment:    ICD-10-CM    1. Status post total replacement of left hip Z96.642    2. Fall, sequela W19.XXXS    3. Low magnesium level R79.0    4. Hyperkalemia E87.5    5. Blood loss anemia D50.0    6. Acute deep vein thrombosis (DVT) of proximal vein of left lower extremity (H) I82.4Y2    7. Protein-calorie malnutrition, unspecified severity (H) E46        Plan:  RONNIE: follow up with orthopedics in 2 weeks.  Pain is improving.  Counseled patient to continue to move and using the pain medications to assist with improve mobility.  Continue with oxycodone 5 to 10 mg every 3-4 hours we discussed stretching this out within the next week.  Hydroxyzine is now PRN.     Falls: No falls at the facility.    Vitamin D deficiency: Continue with calcium plus vitamin D but will also start on vitamin D 50,000 international units weekly x4 weeks then change to 1000 international units daily.  Will recheck a vitamin D in 1 month.  Will discontinue vitamin D draw that scheduled for tomorrow.    DVT: Continue on Coumadin 4 mg daily and is going to have an INR checked tomorrow.  Will need IVC filter removed in the near future however I would like to hold off on that until she is finished with rehab.    Low magnesium: Resolved    Hyperkalemia: Resolved we will continue to monitor by rechecking BMP next week.    Anemia: Improving will have a CBC done tomorrow with a ferritin level.    Malnutrition: DC boost breeze per patient preference and start on Ensure supplement.  Dietitian following.      Electronically signed by: Argenis Vera, CNP

## 2021-06-19 NOTE — LETTER
Letter by Argenis Vera CNP at      Author: Argenis Vera CNP Service: -- Author Type: --    Filed:  Encounter Date: 6/20/2019 Status: (Other)         Patient: Vanita Pete   MR Number: [id number]   YOB: 1945   Date of Visit: 6/20/2019     [facility]      Facility:    [facility] [[id number]]  Code Status: FULL CODE      Chief Complaint/Reason for Visit:  Chief Complaint   Patient presents with   ? Follow Up       HPI:   Vanita is a 74 y.o. female who was seen for an initial TCU visit. She has a past medical history of anemia, distant hx of alcohol abuse, and malnutrition.  She was hospitalized at [facility] 6/11/2019 to 6/17/2019 multiple falls at home.  She had reported falling 3 weeks ago with left pain but thought it would improve so she was not evaluated.  Subsequently she had more falls and pain became unbearable so she went to the ER.  She was to have a la left hip fracture and underwent a left RONNIE. Prior to surgery she was found to have a Left LE acute DVT and an IVC filter was placed. She was placed on coumadin postoperatively.  Post operatively her issues were pain management and electrolyte imbalance including hyperkalemia and hypomagnesemia.  These both resolved with replacement and she was discharged without oral supplements. 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  Neurosurgery recommended a repeat CT in 2 months.     Today, she states her pain improves with pain medication however she is frustrated that she has to wait longer than 4 hours and she states that the 10mg isn't enough.  I did explain that the order was written for oxycodone every 3 hours as needed and for the next couple of days she may just need that to improve until the swelling improves.  She has swelling of her lower leg and knee but minimal swelling of left hip.  She has good mobility and CMS to both limbs.  She has no s/s of DVT of  right leg.  She reports she has never had a DEXA scan and she has no intention to follow up on that.  She reports she visits the doctor minimally.  She is to follow up with Orthopedics for incision assessment in 2 weeks.  Incisional dressing is to remain intact until that visit. She also has a stage 2 pressure ulcer on her coccyx that is treated with a mepilex every 5 days.  She reports good bowel movements and denies any urinary issues.     Past Medical History:  Past Medical History:   Diagnosis Date   ? DVT (deep vein thrombosis) in pregnancy (H)    ? Fractured hip, left, closed, initial encounter (H)            Surgical History:  Past Surgical History:   Procedure Laterality Date   ? IR IVC FILTER PLACEMENT  6/11/2019   ? NO PAST SURGERIES     ? SD TOTAL HIP ARTHROPLASTY Left 6/11/2019    Procedure: ARTHROPLASTY, LEFT HIP, TOTAL;  Surgeon: Andre Espinal MD;  Location: [facility] [place];  Service: Orthopedics       Family History:   No family history on file.    Social History:    Social History     Socioeconomic History   ? Marital status:      Spouse name: Not on file   ? Number of children: Not on file   ? Years of education: Not on file   ? Highest education level: Not on file   Occupational History   ? Not on file   Social Needs   ? Financial resource strain: Not on file   ? Food insecurity:     Worry: Not on file     Inability: Not on file   ? Transportation needs:     Medical: Not on file     Non-medical: Not on file   Tobacco Use   ? Smoking status: Current Every Day Smoker   ? Smokeless tobacco: Current User   ? Tobacco comment: smoke E-cigarettes   Substance and Sexual Activity   ? Alcohol use: No     Comment: former alcoholic; sober x 14 years   ? Drug use: No   ? Sexual activity: Not on file   Lifestyle   ? Physical activity:     Days per week: Not on file     Minutes per session: Not on file   ? Stress: Not on file   Relationships   ? Social connections:     Talks on phone: Not on file      Gets together: Not on file     Attends Anabaptism service: Not on file     Active member of club or organization: Not on file     Attends meetings of clubs or organizations: Not on file     Relationship status: Not on file   ? Intimate partner violence:     Fear of current or ex partner: Not on file     Emotionally abused: Not on file     Physically abused: Not on file     Forced sexual activity: Not on file   Other Topics Concern   ? Not on file   Social History Narrative    , daughter   Has 7 children        Review of Systems   Patient denies fever, chills, headache, lightheadedness, dizziness, rhinorrhea, cough, congestion, shortness of breath, chest pain, palpitations, abdominal pain, n/v, diarrhea, constipation, change in appetite, dysuria, frequency, burning or pain with urination.  Other than stated in HPI all other review of systems is negative.             Physical Exam     Vital signs: 113/60, HR 97, resp 24, tep 98.2, 102.9lbs.   GENERAL APPEARANCE: Thin elderly female in no acute distress.  HEENT: normocephalic, atraumatic  PERRL, sclerae anicteric, conjunctivae clear and moist, EOM intact  NECK: Supple and symmetric. Trachea is midline, no thyromegaly, no adenopathy, and no tenderness  LUNGS: Lung sounds CTA, no adventitious sounds, respiratory effort normal.  CARD: RRR, S1, S2, without murmurs, gallops, rubs, no JVD,   ABD: Soft and nontender with normal bowel sounds.   MSK: Muscle strength and tone were normal.  EXTREMITIES: edema and significant bruising to her left knee, swelling of left lower extremity.  Good CMS bilaterally LE  NEURO: Alert and oriented x 3. Face is symmetric.  SKIN: Left hip incision is dressed with occlusive dressing.  Coccyx ulcer dressed with occlusive dressing.   PSYCH: euthymic          Medication List:  Current Outpatient Medications   Medication Sig   ? hydrOXYzine HCl (ATARAX) 25 MG tablet Take 25 mg by mouth every 6 (six) hours as needed for itching.   ?  acetaminophen (TYLENOL) 500 MG tablet Take 2 tablets (1,000 mg total) by mouth 3 (three) times a day.   ? oxyCODONE (ROXICODONE) 5 MG immediate release tablet Take 1-2 tablets (5-10 mg total) by mouth every 3 (three) hours as needed.   ? senna-docusate (PERICOLACE) 8.6-50 mg tablet Take 1 tablet by mouth 2 (two) times a day.   ? warfarin (COUMADIN/JANTOVEN) 4 MG tablet Take 1 tablet (4 mg total) by mouth daily. Take 1 tablet (4 mg) by mouth daily. Adjust dose based on INR results as directed. (Patient taking differently: Take 4 mg by mouth daily. 6/18/19 INR 1.45 4mg daily. NExt INR 6/20.  6/17/19 INR 1.63 4mg  6/16/19 INR 1.35 had 4mg  6/15/19 INR 1.13 had 4mg.  Also had 4mg 6/13 & 14th.  Take 1 tablet (4 mg) by mouth daily. Adjust dose based on INR results as directed.      )       Labs:  Recent Results (from the past 240 hour(s))   HM2(CBC w/o Differential)   Result Value Ref Range    WBC 7.4 4.0 - 11.0 thou/uL    RBC 3.40 (L) 3.80 - 5.40 mill/uL    Hemoglobin 10.3 (L) 12.0 - 16.0 g/dL    Hematocrit 34.4 (L) 35.0 - 47.0 %     (H) 80 - 100 fL    MCH 30.3 27.0 - 34.0 pg    MCHC 29.9 (L) 32.0 - 36.0 g/dL    RDW 13.9 11.0 - 14.5 %    Platelets 393 140 - 440 thou/uL    MPV 8.3 (L) 8.5 - 12.5 fL   Basic Metabolic Panel   Result Value Ref Range    Sodium 138 136 - 145 mmol/L    Potassium 4.9 3.5 - 5.0 mmol/L    Chloride 107 98 - 107 mmol/L    CO2 23 22 - 31 mmol/L    Anion Gap, Calculation 8 5 - 18 mmol/L    Glucose 88 70 - 125 mg/dL    Calcium 9.8 8.5 - 10.5 mg/dL    BUN 32 (H) 8 - 28 mg/dL    Creatinine 0.89 0.60 - 1.10 mg/dL    GFR MDRD Af Amer >60 >60 mL/min/1.73m2    GFR MDRD Non Af Amer >60 >60 mL/min/1.73m2   ECG 12 lead nursing unit performed   Result Value Ref Range    SYSTOLIC BLOOD PRESSURE  mmHg    DIASTOLIC BLOOD PRESSURE  mmHg    VENTRICULAR RATE 91 BPM    ATRIAL RATE 91 BPM    P-R INTERVAL 122 ms    QRS DURATION 74 ms    Q-T INTERVAL 346 ms    QTC CALCULATION (BEZET) 425 ms    P Axis 86 degrees     R AXIS 74 degrees    T AXIS 72 degrees    MUSE DIAGNOSIS       Sinus rhythm with Premature atrial complexes  Minimal voltage criteria for LVH, may be normal variant  ST abnormality, possible digitalis effect  Abnormal ECG    Confirmed by LESLIE BARBA MD LOC: ([id number]) on 6/11/2019 3:28:33 PM     Echo Complete   Result Value Ref Range    LV volume diastolic 49.8 46 - 106 cm3    LV volume systolic 12.4 (!) 14 - 42 cm3    HR 77 bpm    IVSd 0.916 (!) 0.6 - 0.9 cm    LVIDd 3.72 (!) 3.8 - 5.2 cm    LVIDs 2.37 2.2 - 3.5 cm    LVOT diam 2.1 cm    LVOT mean gradient 3 mmHg    LVOT peak VTI 21.7 cm    LVOT mean neil 76.5 cm/s    LVOT peak neil 112 cm/s    LVOT peak gradient 5 mmHg    LV PWd 0.959 (!) 0.6 - 0.9 cm    MV E' lat neil 8.51 cm/s    MV E' med neil 9.86 cm/s    AV cusp sep 1.9 cm    AV cusp sep 1.9 cm    AV mean neil 95.3 cm/s    AV mean gradient 4 mmHg    AV VTI 26.4 cm    AV peak neil 124 cm/s    AO root 3.1 cm    LA size 2.5 cm    LA length 3.6 cm    MV decel slope 6,110 mm/s2    MV decel time 208 ms    MV P 1/2 time 54 ms    MV peak A neil 73.7 cm/s    MV peak E neil 76.2 cm/s    MV mean neil 71.8 cm/s    MV mean gradient 2 mmHg    MV VTI 19.8 cm    MV peak velocityoctiy 105 cm/s    TR peak neil 286 cm/s    LA area 2 10.3 cm2    LA area 1 10.7 cm2    BSA 1.42 m2    Hieght 65 in    Weight 1,550.4 lbs    /72 mmHg    IVS/PW ratio 1.0     TR peak gradent 32.7 mmHg    LV FS 36.3 28 - 44 %    Echo LVEF calculated 75 55 - 75 %    LA volume 26.0 mL    LV mass 103.5 g    AV area 2.8 cm2    AV DIM IND neil 0.9     MV area p 1/2 time 4.1 cm2    MV area cont eq 3.8 cm2    MV E/A Ratio 1.0     LVOT area 3.46 cm2    LVOT SV 75.1 cm3    AV peak gradient 6.2 mmHg    MV peak gradient 4.4 mmHg    LV systolic volume index 8.7 8 - 24 cm3/m2    LV diastolic volume index 35.1 29 - 61 cm3/m2    LA volume index 18.3 mL/m2    LV mass index 72.9 g/m2    LV SVi 52.9 ml/m2    TAPSE 2.0 cm    MV med E/e' ratio 7.7     MV lat E/e' ratio 9.0      LV CO 5.8 l/min    LV Ci 4.1 l/min/m2    Height 65.0 in    Weight 97 lbs    MV Avg E/e' Ratio 8.3 cm/s    AV DIM IND VTI 0.8     MVA VTI 3.79 cm2    Echo LVEF Estimated 65 %   Magnesium   Result Value Ref Range    Magnesium 1.7 (L) 1.8 - 2.6 mg/dL   Protime-INR   Result Value Ref Range    INR 0.91 0.90 - 1.10   Hemoglobin   Result Value Ref Range    Hemoglobin 8.9 (L) 12.0 - 16.0 g/dL   Basic metabolic panel   Result Value Ref Range    Sodium 136 136 - 145 mmol/L    Potassium 6.6 (HH) 3.5 - 5.0 mmol/L    Chloride 108 (H) 98 - 107 mmol/L    CO2 19 (L) 22 - 31 mmol/L    Anion Gap, Calculation 9 5 - 18 mmol/L    Glucose 94 70 - 125 mg/dL    Calcium 8.5 8.5 - 10.5 mg/dL    BUN 20 8 - 28 mg/dL    Creatinine 0.84 0.60 - 1.10 mg/dL    GFR MDRD Af Amer >60 >60 mL/min/1.73m2    GFR MDRD Non Af Amer >60 >60 mL/min/1.73m2   Magnesium   Result Value Ref Range    Magnesium 2.7 (H) 1.8 - 2.6 mg/dL   INR   Result Value Ref Range    INR 1.00 0.90 - 1.10   HM1 (CBC with Diff)   Result Value Ref Range    WBC 7.3 4.0 - 11.0 thou/uL    RBC 2.39 (L) 3.80 - 5.40 mill/uL    Hemoglobin 7.3 (L) 12.0 - 16.0 g/dL    Hematocrit 24.6 (L) 35.0 - 47.0 %     (H) 80 - 100 fL    MCH 30.5 27.0 - 34.0 pg    MCHC 29.7 (L) 32.0 - 36.0 g/dL    RDW 13.7 11.0 - 14.5 %    Platelets 259 140 - 440 thou/uL    MPV 8.2 (L) 8.5 - 12.5 fL    Neutrophils % 81 (H) 50 - 70 %    Lymphocytes % 10 (L) 20 - 40 %    Monocytes % 9 2 - 10 %    Eosinophils % 0 0 - 6 %    Basophils % 0 0 - 2 %    Neutrophils Absolute 5.9 2.0 - 7.7 thou/uL    Lymphocytes Absolute 0.7 (L) 0.8 - 4.4 thou/uL    Monocytes Absolute 0.6 0.0 - 0.9 thou/uL    Eosinophils Absolute 0.0 0.0 - 0.4 thou/uL    Basophils Absolute 0.0 0.0 - 0.2 thou/uL   ECG 12 lead with MUSE, verify if completed in ER   Result Value Ref Range    SYSTOLIC BLOOD PRESSURE  mmHg    DIASTOLIC BLOOD PRESSURE  mmHg    VENTRICULAR RATE 86 BPM    ATRIAL RATE 86 BPM    P-R INTERVAL 122 ms    QRS DURATION 78 ms    Q-T INTERVAL  362 ms    QTC CALCULATION (BEZET) 433 ms    P Axis 79 degrees    R AXIS 59 degrees    T AXIS 60 degrees    MUSE DIAGNOSIS       Normal sinus rhythm  Normal ECG  When compared with ECG of 11-JUN-2019 12:23,  Premature atrial complexes are no longer Present  Confirmed by EPIFANIO GRIJALVA MD LOC: ([id number]) on 6/12/2019 4:32:23 PM     Potassium   Result Value Ref Range    Potassium 4.8 3.5 - 5.0 mmol/L   Albumin   Result Value Ref Range    Albumin 2.7 (L) 3.5 - 5.0 g/dL   POCT Glucose   Result Value Ref Range    Glucose 150 (H) 70 - 139 mg/dL   Basic Metabolic Panel   Result Value Ref Range    Sodium 136 136 - 145 mmol/L    Potassium 5.0 3.5 - 5.0 mmol/L    Chloride 102 98 - 107 mmol/L    CO2 28 22 - 31 mmol/L    Anion Gap, Calculation 6 5 - 18 mmol/L    Glucose 112 70 - 125 mg/dL    Calcium 8.2 (L) 8.5 - 10.5 mg/dL    BUN 23 8 - 28 mg/dL    Creatinine 1.00 0.60 - 1.10 mg/dL    GFR MDRD Af Amer >60 >60 mL/min/1.73m2    GFR MDRD Non Af Amer 54 (L) >60 mL/min/1.73m2   Hemoglobin   Result Value Ref Range    Hemoglobin 6.4 (LL) 12.0 - 16.0 g/dL   Type and Screen   Result Value Ref Range    ABORh A POS     Antibody Screen Negative Negative   Magnesium   Result Value Ref Range    Magnesium 1.6 (L) 1.8 - 2.6 mg/dL   Basic Metabolic Panel   Result Value Ref Range    Sodium 136 136 - 145 mmol/L    Potassium 4.4 3.5 - 5.0 mmol/L    Chloride 105 98 - 107 mmol/L    CO2 26 22 - 31 mmol/L    Anion Gap, Calculation 5 5 - 18 mmol/L    Glucose 93 70 - 125 mg/dL    Calcium 8.1 (L) 8.5 - 10.5 mg/dL    BUN 20 8 - 28 mg/dL    Creatinine 0.81 0.60 - 1.10 mg/dL    GFR MDRD Af Amer >60 >60 mL/min/1.73m2    GFR MDRD Non Af Amer >60 >60 mL/min/1.73m2   Hemoglobin - Daily x 2   Result Value Ref Range    Hemoglobin 8.0 (L) 12.0 - 16.0 g/dL   Crossmatch   Result Value Ref Range    Crossmatch Compatible     Blood Expiration Date [id number]     Unit Type A Pos     Unit Number [id number]     Status Transfused     Component Red Blood Cells      PRODUCT CODE [id number]     Issue Date and Time [id number]     Blood Type 6200     CODING SYSTEM [id number]    INR   Result Value Ref Range    INR 0.94 0.90 - 1.10   Basic Metabolic Panel   Result Value Ref Range    Sodium 138 136 - 145 mmol/L    Potassium 4.2 3.5 - 5.0 mmol/L    Chloride 107 98 - 107 mmol/L    CO2 26 22 - 31 mmol/L    Anion Gap, Calculation 5 5 - 18 mmol/L    Glucose 113 70 - 125 mg/dL    Calcium 8.3 (L) 8.5 - 10.5 mg/dL    BUN 19 8 - 28 mg/dL    Creatinine 0.73 0.60 - 1.10 mg/dL    GFR MDRD Af Amer >60 >60 mL/min/1.73m2    GFR MDRD Non Af Amer >60 >60 mL/min/1.73m2   Hemoglobin   Result Value Ref Range    Hemoglobin 7.8 (L) 12.0 - 16.0 g/dL   Magnesium   Result Value Ref Range    Magnesium 1.6 (L) 1.8 - 2.6 mg/dL   INR   Result Value Ref Range    INR 1.13 (H) 0.90 - 1.10   Basic Metabolic Panel   Result Value Ref Range    Sodium 140 136 - 145 mmol/L    Potassium 4.7 3.5 - 5.0 mmol/L    Chloride 105 98 - 107 mmol/L    CO2 28 22 - 31 mmol/L    Anion Gap, Calculation 7 5 - 18 mmol/L    Glucose 97 70 - 125 mg/dL    Calcium 8.8 8.5 - 10.5 mg/dL    BUN 21 8 - 28 mg/dL    Creatinine 0.71 0.60 - 1.10 mg/dL    GFR MDRD Af Amer >60 >60 mL/min/1.73m2    GFR MDRD Non Af Amer >60 >60 mL/min/1.73m2   Hemoglobin   Result Value Ref Range    Hemoglobin 8.0 (L) 12.0 - 16.0 g/dL   Magnesium   Result Value Ref Range    Magnesium 1.8 1.8 - 2.6 mg/dL   INR   Result Value Ref Range    INR 1.35 (H) 0.90 - 1.10   Magnesium   Result Value Ref Range    Magnesium 1.6 (L) 1.8 - 2.6 mg/dL   Basic Metabolic Panel   Result Value Ref Range    Sodium 137 136 - 145 mmol/L    Potassium 4.6 3.5 - 5.0 mmol/L    Chloride 103 98 - 107 mmol/L    CO2 29 22 - 31 mmol/L    Anion Gap, Calculation 5 5 - 18 mmol/L    Glucose 100 70 - 125 mg/dL    Calcium 8.5 8.5 - 10.5 mg/dL    BUN 21 8 - 28 mg/dL    Creatinine 0.69 0.60 - 1.10 mg/dL    GFR MDRD Af Amer >60 >60 mL/min/1.73m2    GFR MDRD Non Af Amer >60 >60 mL/min/1.73m2   Hemoglobin   Result  Value Ref Range    Hemoglobin 7.2 (L) 12.0 - 16.0 g/dL   INR   Result Value Ref Range    INR 1.63 (H) 0.90 - 1.10   Magnesium   Result Value Ref Range    Magnesium 1.8 1.8 - 2.6 mg/dL   Basic Metabolic Panel   Result Value Ref Range    Sodium 139 136 - 145 mmol/L    Potassium 4.9 3.5 - 5.0 mmol/L    Chloride 101 98 - 107 mmol/L    CO2 31 22 - 31 mmol/L    Anion Gap, Calculation 7 5 - 18 mmol/L    Glucose 98 70 - 125 mg/dL    Calcium 8.8 8.5 - 10.5 mg/dL    BUN 22 8 - 28 mg/dL    Creatinine 0.75 0.60 - 1.10 mg/dL    GFR MDRD Af Amer >60 >60 mL/min/1.73m2    GFR MDRD Non Af Amer >60 >60 mL/min/1.73m2   Hemoglobin   Result Value Ref Range    Hemoglobin 7.5 (L) 12.0 - 16.0 g/dL   INR   Result Value Ref Range    INR 1.45 (H) 0.90 - 1.10   INR   Result Value Ref Range    INR 1.78 (H) 0.90 - 1.10         Assessment:    ICD-10-CM    1. Status post total replacement of left hip Z96.642    2. Fall, sequela W19.XXXS    3. Low magnesium level R79.0    4. Hyperkalemia E87.5    5. Blood loss anemia D50.0    6. Hip fracture, left, closed, initial encounter (H) S72.002A    7. Acute deep vein thrombosis (DVT) of proximal vein of left lower extremity (H) I82.4Y2    8. Protein-calorie malnutrition, unspecified severity (H) E46        Plan:  RONNIE: follow up with orthopedics in 2 weeks.  Apply ICE every hour. Continue with oxycodone 5-10mg every 3 hours.  Add hydroxyzine 25mg every 6 hours with oxycodone. Continue to wear TEDS on during the day and off at night.  Continue with Senna-S prn.     Hypomagnesemia: will check mag on Monday.     Hyperkalemia: will check potassium on Monday.     Anemia: will check CBC on Monday    Hip fracture: check TSH and vitamin D level.  It is likely patient will not agree to fosamax.  I did  on the fact that she likely needed Calcium and vitamin D for bone healing.  Will discuss again with her and hopefully she will accept.     DVT: IVC filter due to be removed in 2 weeks.  INR today was 1.78.  Will  give 5mg 6/20, 6/21, 2.5mg 6/22 and 5mg 6/23 and recheck 6/24.     Malnutrition: Albumin 2.7 will add oral supplements BID    40 minutes total time spent with 20 minutes spent face to face with patient in counseling of pathology of DVT, purpose of IVC filter, and coumadin.  Counseling of pathology of fractures and possible relation to osteoporosis and work up for that and coordination of the above plan of care.       Electronically signed by: Argenis Vera CNP

## 2021-06-19 NOTE — LETTER
Letter by Ruth Yost MD at      Author: Ruth Yost MD Service: -- Author Type: --    Filed:  Encounter Date: 6/24/2019 Status: (Other)         Patient: Vanita Pete   MR Number: [id number]   YOB: 1945   Date of Visit: 6/24/2019                               [facility]        Visit Type: H & P (Left hip fracture status post L RONNIE)    Code Status:  FULL CODE  Facility:  [facility] [[id number]]          PCP: Elton Potts MD       PHONE: [phone]     FAX:[phone]        ASSESSMENT/PLAN:  1. Status post total replacement of left hip   improving.  Continue PT/OT, pain medications including APAP, hydroxyzine as needed, oxycodone as needed.  A very long discussion was done with the patient regarding narcotic use and the need to taper narcotics in the next week or so.  A long discussion was also done regarding osteoporosis and need for bone DEXA scan as an outpatient and starting bisphosphonates.  Patient states that she will follow-up with primary MD regarding scheduling a bone DEXA scan   2. Blood loss anemia   multifactorial with current surgery exacerbating chronic anemia of unclear cause.  Hemoglobin on 4/3/2019 is at 8.3.  Will check iron studies and recheck hemoglobin on 6/27.  Consider colonoscopy as outpatient.  We will also check B12 levels   3. Acute deep vein thrombosis (DVT) of proximal vein of left lower extremity (H)   will need to follow-up with IR regarding filter with removal after 2 months.  INR at 2.11, will continue 4 mg of Coumadin with INR goal 1.8-2.5 in this patient who has anemia.  Check INR on 6/26   4. Hyperkalemia   potassium at 5.1 with previous potassium at 4.9 on 6/17.  Avoid potassium rich foods, recheck potassium level on 6/26   5. Protein-calorie malnutrition, unspecified severity (H)   dietitian to see.  Patient is not as antispastic regarding her nutrition status since she she states that her  weight has been normal for her at baseline.  I did explain to her that it is important to build her nutrition and strength to prevent further deconditioning and falls.  Albumin on 4/1/2019 is at 1.7   6. Pressure injury of coccygeal region, stage 2   continue Mepilex Q 5 days   7.      Abnormal head CT with hyperostosis-repeat head CT in 2 months per neurosurgery recommendation      HISTORY OF PRESENT ILLNESS:   Vanita Pete is a 74 y.o. female with a history of CHAN now with normal renal function, anxiety disorder, eating disorder with malnutrition, distant history of EtOH abuse, anemia who was needed for a closed left hip fracture.  According to the patient, she had multiple falls at home but her first fall was approximately 4 weeks ago where she noted mild pain on her left hip.  She noted gradual worsening of her pain and subsequent falls which brought her to the ER.  She underwent left RONNIE on 6/11/2019.  Prior to her operation, the patient was also noted to have increased left leg swelling and ultrasound showed left peroneal vein DVT.  An IVC filter was placed on 6/11/2019.  Postoperatively, the patient did not have any other complications and the patient was started on Coumadin for her DVT, she was also started on PT/OT with orthopedic recommendations of WBAT on LLE.  Of note is that there was an incidental finding of an abnormal widening of the right frontal bone with hyperostosis on CT scan of the head in the ER.  Neurosurgery was consulted and recommendation is for repeat imaging in 2 months.    Currently, the patient states that her pain is better when given oxycodone every 3 hours as needed.  She notes that she continues to have left leg swelling although denies any pain in this area.  She does have anemia which has been chronic but denies any weakness or rectal bleeding.  Her appetite is fairly stable and she states that she sleeps fine.  She denies any abdominal pain, diarrhea or constipation, urinary  "symptoms.  She does not have any fevers or chills, chest pains or increased shortness of breath.  She does not have any palpitations.  Not have any dizziness or lightheadedness.    Other review of systems are negative.          PAST MEDICAL/SURGICAL HISTORY:  Past Medical History:   Diagnosis Date   ? DVT (deep vein thrombosis) in pregnancy (H)    ? Fractured hip, left, closed, initial encounter (H)      Past Surgical History:   Procedure Laterality Date   ? IR IVC FILTER PLACEMENT  6/11/2019   ? NO PAST SURGERIES     ? MI TOTAL HIP ARTHROPLASTY Left 6/11/2019    Procedure: ARTHROPLASTY, LEFT HIP, TOTAL;  Surgeon: Andre Espinal MD;  Location: [facility] [place];  Service: Orthopedics       SOCIAL HISTORY: He is  and lives with her , no alcohol use since 15 years ago, smoker/\"a few cigarettes per day\"      FAMILY HISTORY:  No family history on file.    MEDICATIONS:  Current Outpatient Medications on File Prior to Visit   Medication Sig Dispense Refill   ? acetaminophen (TYLENOL) 500 MG tablet Take 2 tablets (1,000 mg total) by mouth 3 (three) times a day.  0   ? hydrOXYzine HCl (ATARAX) 25 MG tablet Take 25 mg by mouth every 6 (six) hours as needed for itching.     ? oxyCODONE (ROXICODONE) 5 MG immediate release tablet Take 1-2 tablets (5-10 mg total) by mouth every 3 (three) hours as needed. 20 tablet 0   ? senna-docusate (PERICOLACE) 8.6-50 mg tablet Take 1 tablet by mouth 2 (two) times a day.  0   ? warfarin (COUMADIN/JANTOVEN) 4 MG tablet Take 1 tablet (4 mg total) by mouth daily. Take 1 tablet (4 mg) by mouth daily. Adjust dose based on INR results as directed. (Patient taking differently: Take 4 mg by mouth daily. 6/18/19 INR 1.45 4mg daily. NExt INR 6/20.  6/17/19 INR 1.63 4mg  6/16/19 INR 1.35 had 4mg  6/15/19 INR 1.13 had 4mg.  Also had 4mg 6/13 & 14th.  Take 1 tablet (4 mg) by mouth daily. Adjust dose based on INR results as directed.      ) 30 tablet 0     No current facility-administered " medications on file prior to visit.        ALLERGIES:  No Known Allergies      PHYSICAL EXAMINATION:  Vital signs 108/56, 97.3, 78, 103 pounds, 16, 93% room air  General: Awake, Alert, oriented x3, not in any form of acute distress, answers questions appropriately, follows simple commands, conversant, thin  HEENT: Pink conjunctiva, anicteric sclerae, oral mucosa is moist  NECK: Supple, without any lymphadenopathy, thyromegaly or any masses  LUNG: Clear to auscultation, good chest expansion. There are no crackles, no wheezes, normal AP diameter  BACK: No kyphosis of the thoracic spine  CVS: There is good S1  S2, there are no murmurs, no heaves, rhythm is regular  ABDOMEN: Globular and soft, nontender to palpation, no organomegaly, good bowel sounds  EXTREMITIES: Good range of motion on both upper and lower extremities except on left hip where his decrease R OM and with left surgical site that is clean, without any discharge or redness but with minimal swelling, no pedal edema on the right, +1-2 pedal edema on the left, no cyanosis or clubbing, no calf tenderness  SKIN: Warm and dry, no rashes or erythema noted    LABS:  Lab Results   Component Value Date    WBC 11.9 (H) 06/24/2019    HGB 8.5 (L) 06/26/2019    HCT 29.4 (L) 06/24/2019     (H) 06/24/2019     (H) 06/24/2019     Lab Results   Component Value Date    CREATININE 0.91 06/24/2019    BUN 25 06/24/2019     06/24/2019    K 5.1 (H) 06/26/2019    CL 99 06/24/2019    CO2 27 06/24/2019           >45 minutes of total time spent, greater than 55% of the time spent in coordination of care and counseling regarding the above medical issues and plan of care long discussion regarding medications and current medical status as discussed in plans above. I have reviewed the patient's medical records, labs and medications.       Electronically signed by:Ruth Yost MD

## 2021-06-19 NOTE — LETTER
Letter by Argenis Vera CNP at      Author: Argenis Vera CNP Service: -- Author Type: --    Filed:  Encounter Date: 6/27/2019 Status: (Other)         Patient: Vanita Pete   MR Number: [id number]   YOB: 1945   Date of Visit: 6/27/2019     [facility]      Facility:    [facility] [[id number]]  Code Status: FULL CODE      Chief Complaint/Reason for Visit:  Chief Complaint   Patient presents with   ? Follow Up       HPI:   Vanita is a 74 y.o. female who was seen for a TCU visit. She has a past medical history of anemia, distant hx of alcohol abuse, and malnutrition.  She was hospitalized at [facility] 6/11/2019 to 6/17/2019 multiple falls at home.  She had reported falling 3 weeks ago with left pain but thought it would improve so she was not evaluated.  Subsequently she had more falls and pain became unbearable so she went to the ER.  She was to have a la left hip fracture and underwent a left RONNIE. Prior to surgery she was found to have a Left LE acute DVT and an IVC filter was placed. She was placed on coumadin postoperatively.  Post operatively her issues were pain management and electrolyte imbalance including hyperkalemia and hypomagnesemia.  These both resolved with replacement and she was discharged without oral supplements. 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  Neurosurgery recommended a repeat CT in 2 months.     Today, she has lots of questions regarding the recheck on her potassium today.  Yesterday, her BMP showed a K of 5.1.  She was given Kayexalate 15gm last night and recheck of K was 4.5.  She is not on any potassium supplements.  She denies any palpitations or chest pain.  She reports her pain is stable on the oxycodone prn.  She continues to have +1 pitting edema of her left lower extremity and she is requesting a diuretic.  She has good cms to her lower extremity with strong pedal pulse.     Her  hgb checked yesterday is stable at 8.5.  She had good ferritin level and is on FeSo4 supplement.  She has not had a vit B12 checked recently.     Past Medical History:  Past Medical History:   Diagnosis Date   ? DVT (deep vein thrombosis) in pregnancy (H)    ? Fractured hip, left, closed, initial encounter (H)            Surgical History:  Past Surgical History:   Procedure Laterality Date   ? IR IVC FILTER PLACEMENT  6/11/2019   ? NO PAST SURGERIES     ? AL TOTAL HIP ARTHROPLASTY Left 6/11/2019    Procedure: ARTHROPLASTY, LEFT HIP, TOTAL;  Surgeon: Andre Espinal MD;  Location: [facility] [place];  Service: Orthopedics       Family History:   No family history on file.    Social History:    Social History     Socioeconomic History   ? Marital status:      Spouse name: Not on file   ? Number of children: Not on file   ? Years of education: Not on file   ? Highest education level: Not on file   Occupational History   ? Not on file   Social Needs   ? Financial resource strain: Not on file   ? Food insecurity:     Worry: Not on file     Inability: Not on file   ? Transportation needs:     Medical: Not on file     Non-medical: Not on file   Tobacco Use   ? Smoking status: Current Every Day Smoker   ? Smokeless tobacco: Current User   ? Tobacco comment: smoke E-cigarettes   Substance and Sexual Activity   ? Alcohol use: No     Comment: former alcoholic; sober x 14 years   ? Drug use: No   ? Sexual activity: Not on file   Lifestyle   ? Physical activity:     Days per week: Not on file     Minutes per session: Not on file   ? Stress: Not on file   Relationships   ? Social connections:     Talks on phone: Not on file     Gets together: Not on file     Attends [employer] service: Not on file     Active member of club or organization: Not on file     Attends meetings of clubs or organizations: Not on file     Relationship status: Not on file   ? Intimate partner violence:     Fear of current or ex partner: Not on file      Emotionally abused: Not on file     Physically abused: Not on file     Forced sexual activity: Not on file   Other Topics Concern   ? Not on file   Social History Narrative    , daughter   Has 7 children        Review of Systems   Patient denies fever, chills, headache, lightheadedness, dizziness, rhinorrhea, cough, congestion, shortness of breath, chest pain, palpitations, abdominal pain, n/v, diarrhea, constipation, change in appetite, dysuria, frequency, burning or pain with urination.  Other than stated in HPI all other review of systems is negative.             Physical Exam     Vital signs:/61, HR 92, resp 24, temp 97.7  GENERAL APPEARANCE: Thin elderly female in no acute distress.  HEENT: normocephalic, atraumatic  PERRL, sclerae anicteric, conjunctivae clear and moist, EOM intact  LUNGS: Lung sounds CTA, no adventitious sounds, respiratory effort normal.  CARD: RRR, S1, S2, without murmurs, gallops, rubs, no JVD,   ABD: Soft and nontender with normal bowel sounds.   MSK: Muscle strength and tone were normal.  EXTREMITIES: 1+ pitting edema of left lower extremity, right lower extremity with soft nonpitting edema.  Good CMS bilaterally LE +pedal pulses  NEURO: Alert and oriented x 3. Face is symmetric.  SKIN: Left hip incision is well approximated without sutures or staples.  No drainage, signs and symptoms of infection  PSYCH: euthymic          Medication List:  Current Outpatient Medications   Medication Sig   ? acetaminophen (TYLENOL) 500 MG tablet Take 2 tablets (1,000 mg total) by mouth 3 (three) times a day.   ? hydrOXYzine HCl (ATARAX) 25 MG tablet Take 25 mg by mouth every 6 (six) hours as needed for itching.   ? oxyCODONE (ROXICODONE) 5 MG immediate release tablet Take 1-2 tablets (5-10 mg total) by mouth every 3 (three) hours as needed.   ? senna-docusate (PERICOLACE) 8.6-50 mg tablet Take 1 tablet by mouth 2 (two) times a day.   ? warfarin (COUMADIN/JANTOVEN) 4 MG tablet Take 1  tablet (4 mg total) by mouth daily. Take 1 tablet (4 mg) by mouth daily. Adjust dose based on INR results as directed. (Patient taking differently: Take 4 mg by mouth daily. 6/18/19 INR 1.45 4mg daily. NExt INR 6/20.  6/17/19 INR 1.63 4mg  6/16/19 INR 1.35 had 4mg  6/15/19 INR 1.13 had 4mg.  Also had 4mg 6/13 & 14th.  Take 1 tablet (4 mg) by mouth daily. Adjust dose based on INR results as directed.      )       Labs:  Recent Results (from the past 240 hour(s))   INR   Result Value Ref Range    INR 1.45 (H) 0.90 - 1.10   INR   Result Value Ref Range    INR 1.78 (H) 0.90 - 1.10   Magnesium   Result Value Ref Range    Magnesium 1.9 1.8 - 2.6 mg/dL   Thyroid Stimulating Hormone (TSH)   Result Value Ref Range    TSH 2.97 0.30 - 5.00 uIU/mL   Vitamin D, Total (25-Hydroxy)   Result Value Ref Range    Vitamin D, Total (25-Hydroxy) 19.7 (L) 30.0 - 80.0 ng/mL   INR   Result Value Ref Range    INR 2.11 (H) 0.90 - 1.10   HM1 (CBC with Diff)   Result Value Ref Range    WBC 11.9 (H) 4.0 - 11.0 thou/uL    RBC 2.83 (L) 3.80 - 5.40 mill/uL    Hemoglobin 8.6 (L) 12.0 - 16.0 g/dL    Hematocrit 29.4 (L) 35.0 - 47.0 %     (H) 80 - 100 fL    MCH 30.4 27.0 - 34.0 pg    MCHC 29.3 (L) 32.0 - 36.0 g/dL    RDW 14.6 (H) 11.0 - 14.5 %    Platelets 709 (H) 140 - 440 thou/uL    MPV 8.5 8.5 - 12.5 fL    Neutrophils % 79 (H) 50 - 70 %    Lymphocytes % 11 (L) 20 - 40 %    Monocytes % 7 2 - 10 %    Eosinophils % 3 0 - 6 %    Basophils % 1 0 - 2 %    Neutrophils Absolute 9.2 (H) 2.0 - 7.7 thou/uL    Lymphocytes Absolute 1.3 0.8 - 4.4 thou/uL    Monocytes Absolute 0.8 0.0 - 0.9 thou/uL    Eosinophils Absolute 0.3 0.0 - 0.4 thou/uL    Basophils Absolute 0.1 0.0 - 0.2 thou/uL   Basic Metabolic Panel   Result Value Ref Range    Sodium 137 136 - 145 mmol/L    Potassium 5.1 (H) 3.5 - 5.0 mmol/L    Chloride 99 98 - 107 mmol/L    CO2 27 22 - 31 mmol/L    Anion Gap, Calculation 11 5 - 18 mmol/L    Glucose 90 70 - 125 mg/dL    Calcium 9.4 8.5 - 10.5  mg/dL    BUN 25 8 - 28 mg/dL    Creatinine 0.91 0.60 - 1.10 mg/dL    GFR MDRD Af Amer >60 >60 mL/min/1.73m2    GFR MDRD Non Af Amer >60 >60 mL/min/1.73m2   Hemoglobin   Result Value Ref Range    Hemoglobin 8.5 (L) 12.0 - 16.0 g/dL   Potassium   Result Value Ref Range    Potassium 5.1 (H) 3.5 - 5.0 mmol/L   Ferritin   Result Value Ref Range    Ferritin 258 (H) 10 - 130 ng/mL   INR   Result Value Ref Range    INR 2.29 (H) 0.90 - 1.10   Potassium   Result Value Ref Range    Potassium 4.5 3.5 - 5.0 mmol/L         Assessment:    ICD-10-CM    1. Hyperkalemia E87.5    2. Acute deep vein thrombosis (DVT) of proximal vein of left lower extremity (H) I82.4Y2    3. Blood loss anemia D50.0    4. Status post total replacement of left hip Z96.642        Plan:  Hyperkalemia: resolve after 1 dose of kayexalate.  Continue to monitor and check K early next week.  Counseled patient on s/s of hyperkalemia, medication uses and expected responses.     DVT: counseled patient on timing of resolution of edema likely related to her DVT.  Explained that I would like to wait to give her a diuretic as with all medications there are side effects.     Anemia: stable; recheck CBC next week.  Will check B12 today.  I counseled patient on the importance of having a colonoscopy and she states she will not have a colonoscopy.  Again, I urged her to have this done to look for any source of bleeding causing her hgb to go down.       RONNIE: continue with therapies.  Oxycodone for pain. Will discuss weaning her oxycodone next week prior to discharge.         Electronically signed by: Argenis Vera, LESVIA

## 2021-06-19 NOTE — LETTER
Letter by Argenis Vera CNP at      Author: Argenis Vera CNP Service: -- Author Type: --    Filed:  Encounter Date: 7/2/2019 Status: (Other)         Patient: Vanita Pete   MR Number: [id number]   YOB: 1945   Date of Visit: 7/2/2019     Code Status:  FULL CODE  Visit Type: Discharge Summary     Facility:  [facility] [[id number]]          PCP:  Elton Potts MD  [phone]       Admission Date to our Facility: 6/17/2019 discharge Date from our Facility: 7/3/2019    Discharge Diagnosis:    1. Status post total replacement of left hip     2. Acute deep vein thrombosis (DVT) of proximal vein of left lower extremity (H)     3. Blood loss anemia     4. Hyperkalemia     5. Vitamin D deficiency     6. Protein-calorie malnutrition, unspecified severity (H)          History of Present Illness: Vanita Pete is a 74 y.o. female who has a past medical history of anemia, distant hx of alcohol abuse, and malnutrition.  She was hospitalized at [facility] 6/11/2019 to 6/17/2019 multiple falls at home.  She had reported falling 3 weeks ago with left pain but thought it would improve so she was not evaluated.  Subsequently she had more falls and pain became unbearable so she went to the ER.  She was to have a la left hip fracture and underwent a left RONNIE. Prior to surgery she was found to have a Left LE acute DVT and an IVC filter was placed. She was placed on coumadin postoperatively.  Post operatively her issues were pain management and electrolyte imbalance including hyperkalemia and hypomagnesemia.  These both resolved with replacement and she was discharged without oral supplements. 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  Neurosurgery recommended a repeat CT in 2 months.     Skilled Nursing Facility Course: While at the TCU she progressed to ambulating with a wheeled walker and standby assist.  She has been resistant to any new medications or  treatments and frequently tells me she does not plan to follow-up in regards to her possible osteoporosis, frontal hyperostosis, and anemia needing a colonoscopy.  She continues to use oxycodone frequently throughout the day for her left hip pain.  I did discuss with her weaning down on this in the next week.  Today, I will decrease her use to 5 to 10 mg every 6 hours and would recommend decreasing that to 5 mg next week.  She continues on calcium plus vitamin D and high-dose vitamin D for vitamin D deficiency and presumed osteoporosis.  She states that she does not plan to get a DEXA scan in the near future.  She also refuses to take any alendronate.    She continues to have significant amount of lower extremity edema of her left lower extremity however this has improved in the past week.  She is concerned with her upper thigh edema and I have explained to her that this is likely related to her DVT and will take some time as that improves.  She is requesting a diuretic today and so I will give her Lasix 10 mg today and tomorrow and recommend that she follow-up with her primary provider in the future.     I also strongly encouraged her to follow-up with her primary provider next week in regards to her INR and Coumadin dosing.  Currently she is stable on 4 mg of Coumadin every day, however I am concerned with ongoing use and her potential to be lost to follow-up.  I did explain that her Coumadin would likely need to continue for the next 6 months. She verbalizes that she will plan to have her INR checked in clinic next Wednesday when she sees her primary provider. She may likely be a better candidate for for Xarelto however I be concerned with her ongoing unknown cause of anemia.  She will need to also follow-up for removal of her IVC filter in the next 2 months.    While at the TCU she did have complications with mild hyper hyperkalemia in which she did receive Kayexalate last week with her potassium returning to  normal.  Yesterday her potassium was 5.0 so I recommended that she have a low potassium diet.  I did discuss with her high potassium foods to avoid.    Discharge Medications:    Current Outpatient Medications   Medication Sig Dispense Refill   ? calcium carbonate-vitamin D3 (OS-WILLIAM 250+ D) 250-125 mg-unit Tab per tablet Take 1 tablet by mouth 2 (two) times a day.     ? [START ON 7/25/2019] cholecalciferol, vitamin D3, 1,000 unit tablet Take 1,000 Units by mouth daily.     ? ergocalciferol (ERGOCALCIFEROL) 50,000 unit capsule Take 50,000 Units by mouth once a week.     ? ferrous sulfate 325 (65 FE) MG tablet Take 1 tablet by mouth daily with breakfast.     ? ferrous sulfate 65 mg elemental iron Take 1 tablet by mouth daily with breakfast.     ? acetaminophen (TYLENOL) 500 MG tablet Take 2 tablets (1,000 mg total) by mouth 3 (three) times a day.  0   ? hydrOXYzine HCl (ATARAX) 25 MG tablet Take 25 mg by mouth every 6 (six) hours as needed for itching.     ? oxyCODONE (ROXICODONE) 5 MG immediate release tablet Take 1-2 tablets (5-10 mg total) by mouth every 3 (three) hours as needed. (Patient taking differently: Take 5-10 mg by mouth every 6 (six) hours as needed.       ) 20 tablet 0   ? senna-docusate (PERICOLACE) 8.6-50 mg tablet Take 1 tablet by mouth 2 (two) times a day.  0   ? warfarin sodium (WARFARIN ORAL) Take 4 mg by mouth daily. 7/1/19 INR 1.91  Cont 4mg daily.  Next INR 7/10/19 with PMD.             No current facility-administered medications for this visit.        For most current and accurate medication list, please contact the skilled nursing facility that this patient visit took place at.      Discharge Plan: Patient is stable to discharge to home and has refused any home care services at this time stating she we will plan to follow-up with her primary provider in the next week.  She will need to follow-up with her primary provider in the next 7 days for ongoing Coumadin management, INR draw, management  of potential osteopenia/osteoporosis, recommendations for her anemia including a colonoscopy, follow-up for IVC filter removal and follow-up for hyperkalemia.  She will also need to continue to follow-up with orthopedics as recommended.    Review of Systems   Patient denies fever, chills, headache, lightheadedness, dizziness, rhinorrhea, cough, congestion, shortness of breath, chest pain, palpitations, abdominal pain, n/v, diarrhea, constipation, change in appetite, dysuria, frequency, burning or pain with urination.  Other than stated in HPI all other review of systems is negative.         Physical Exam   Vital signs: /60, heart rate 91, respiratory 18, temp 98.2.  GENERAL APPEARANCE: Thin, well nourished, in no acute distress.  HEENT: normocephalic, atraumatic  PERRL, sclerae anicteric, conjunctivae clear and moist, EOM intact  LUNGS: Lung sounds CTA, no adventitious sounds, respiratory effort normal.  CARD: RRR, S1, S2, without murmurs, gallops, rubs,   ABD: Soft and nontender with normal bowel sounds.   MSK: Muscle strength and tone were normal.  EXTREMITIES: Nonpitting soft edema of left lower extremity wearing FAREED hose  NEURO: Alert and oriented x 3.  Face is symmetric.  SKIN: Incision of left hip is well approximated with no signs of symptoms of infection or drainage.  PSYCH: euthymic          Labs:   Recent Results (from the past 240 hour(s))   Magnesium   Result Value Ref Range    Magnesium 1.9 1.8 - 2.6 mg/dL   Thyroid Stimulating Hormone (TSH)   Result Value Ref Range    TSH 2.97 0.30 - 5.00 uIU/mL   Vitamin D, Total (25-Hydroxy)   Result Value Ref Range    Vitamin D, Total (25-Hydroxy) 19.7 (L) 30.0 - 80.0 ng/mL   INR   Result Value Ref Range    INR 2.11 (H) 0.90 - 1.10   HM1 (CBC with Diff)   Result Value Ref Range    WBC 11.9 (H) 4.0 - 11.0 thou/uL    RBC 2.83 (L) 3.80 - 5.40 mill/uL    Hemoglobin 8.6 (L) 12.0 - 16.0 g/dL    Hematocrit 29.4 (L) 35.0 - 47.0 %     (H) 80 - 100 fL    MCH 30.4  27.0 - 34.0 pg    MCHC 29.3 (L) 32.0 - 36.0 g/dL    RDW 14.6 (H) 11.0 - 14.5 %    Platelets 709 (H) 140 - 440 thou/uL    MPV 8.5 8.5 - 12.5 fL    Neutrophils % 79 (H) 50 - 70 %    Lymphocytes % 11 (L) 20 - 40 %    Monocytes % 7 2 - 10 %    Eosinophils % 3 0 - 6 %    Basophils % 1 0 - 2 %    Neutrophils Absolute 9.2 (H) 2.0 - 7.7 thou/uL    Lymphocytes Absolute 1.3 0.8 - 4.4 thou/uL    Monocytes Absolute 0.8 0.0 - 0.9 thou/uL    Eosinophils Absolute 0.3 0.0 - 0.4 thou/uL    Basophils Absolute 0.1 0.0 - 0.2 thou/uL   Basic Metabolic Panel   Result Value Ref Range    Sodium 137 136 - 145 mmol/L    Potassium 5.1 (H) 3.5 - 5.0 mmol/L    Chloride 99 98 - 107 mmol/L    CO2 27 22 - 31 mmol/L    Anion Gap, Calculation 11 5 - 18 mmol/L    Glucose 90 70 - 125 mg/dL    Calcium 9.4 8.5 - 10.5 mg/dL    BUN 25 8 - 28 mg/dL    Creatinine 0.91 0.60 - 1.10 mg/dL    GFR MDRD Af Amer >60 >60 mL/min/1.73m2    GFR MDRD Non Af Amer >60 >60 mL/min/1.73m2   Hemoglobin   Result Value Ref Range    Hemoglobin 8.5 (L) 12.0 - 16.0 g/dL   Potassium   Result Value Ref Range    Potassium 5.1 (H) 3.5 - 5.0 mmol/L   Ferritin   Result Value Ref Range    Ferritin 258 (H) 10 - 130 ng/mL   INR   Result Value Ref Range    INR 2.29 (H) 0.90 - 1.10   Potassium   Result Value Ref Range    Potassium 4.5 3.5 - 5.0 mmol/L   Folate, Serum   Result Value Ref Range    Folate 17.2 >=3.5 ng/mL   Potassium   Result Value Ref Range    Potassium 5.0 3.5 - 5.0 mmol/L   Vitamin B12   Result Value Ref Range    Vitamin B-12 348 213 - 816 pg/mL   INR   Result Value Ref Range    INR 1.91 (H) 0.90 - 1.10   HM1 (CBC with Diff)   Result Value Ref Range    WBC 6.4 4.0 - 11.0 thou/uL    RBC 3.17 (L) 3.80 - 5.40 mill/uL    Hemoglobin 9.1 (L) 12.0 - 16.0 g/dL    Hematocrit 32.2 (L) 35.0 - 47.0 %     (H) 80 - 100 fL    MCH 28.7 27.0 - 34.0 pg    MCHC 28.3 (L) 32.0 - 36.0 g/dL    RDW 14.6 (H) 11.0 - 14.5 %    Platelets 773 (H) 140 - 440 thou/uL    MPV 8.5 8.5 - 12.5 fL     Neutrophils % 54 50 - 70 %    Lymphocytes % 29 20 - 40 %    Monocytes % 12 (H) 2 - 10 %    Eosinophils % 4 0 - 6 %    Basophils % 1 0 - 2 %    Neutrophils Absolute 3.4 2.0 - 7.7 thou/uL    Lymphocytes Absolute 1.9 0.8 - 4.4 thou/uL    Monocytes Absolute 0.8 0.0 - 0.9 thou/uL    Eosinophils Absolute 0.3 0.0 - 0.4 thou/uL    Basophils Absolute 0.1 0.0 - 0.2 thou/uL         Assessment:  1. Status post total replacement of left hip     2. Acute deep vein thrombosis (DVT) of proximal vein of left lower extremity (H)     3. Blood loss anemia     4. Hyperkalemia     5. Vitamin D deficiency     6. Protein-calorie malnutrition, unspecified severity (H)         MEDICAL EQUIPMENT NEEDS:  NA          35 total minutes spent with 20 minutes spent face-to-face with patient in counseling regarding her follow-ups, recommendations for medications regarding her osteoporosis, anemia, vitamin D deficiency, DVT and anticoagulation, recommendations for colonoscopy.    Electronically signed by: Argenis Vera CNP